# Patient Record
Sex: MALE | Race: WHITE | NOT HISPANIC OR LATINO | Employment: UNEMPLOYED
[De-identification: names, ages, dates, MRNs, and addresses within clinical notes are randomized per-mention and may not be internally consistent; named-entity substitution may affect disease eponyms.]

---

## 2019-10-02 ENCOUNTER — HEALTH MAINTENANCE LETTER (OUTPATIENT)
Age: 59
End: 2019-10-02

## 2021-01-15 ENCOUNTER — HEALTH MAINTENANCE LETTER (OUTPATIENT)
Age: 61
End: 2021-01-15

## 2021-09-05 ENCOUNTER — HEALTH MAINTENANCE LETTER (OUTPATIENT)
Age: 61
End: 2021-09-05

## 2021-11-28 ENCOUNTER — APPOINTMENT (OUTPATIENT)
Dept: GENERAL RADIOLOGY | Facility: CLINIC | Age: 61
End: 2021-11-28
Attending: EMERGENCY MEDICINE
Payer: COMMERCIAL

## 2021-11-28 ENCOUNTER — HOSPITAL ENCOUNTER (EMERGENCY)
Facility: CLINIC | Age: 61
Discharge: HOME | End: 2021-11-28
Attending: EMERGENCY MEDICINE | Admitting: EMERGENCY MEDICINE
Payer: COMMERCIAL

## 2021-11-28 VITALS
BODY MASS INDEX: 26.45 KG/M2 | SYSTOLIC BLOOD PRESSURE: 145 MMHG | HEART RATE: 74 BPM | OXYGEN SATURATION: 95 % | WEIGHT: 195 LBS | TEMPERATURE: 98.3 F | RESPIRATION RATE: 17 BRPM | DIASTOLIC BLOOD PRESSURE: 98 MMHG

## 2021-11-28 DIAGNOSIS — S20.212A RIB CONTUSION, LEFT, INITIAL ENCOUNTER: ICD-10-CM

## 2021-11-28 DIAGNOSIS — S50.02XA CONTUSION OF LEFT ELBOW, INITIAL ENCOUNTER: ICD-10-CM

## 2021-11-28 PROCEDURE — 73080 X-RAY EXAM OF ELBOW: CPT | Mod: LT

## 2021-11-28 PROCEDURE — 250N000011 HC RX IP 250 OP 636: Performed by: EMERGENCY MEDICINE

## 2021-11-28 PROCEDURE — 99285 EMERGENCY DEPT VISIT HI MDM: CPT | Mod: 25

## 2021-11-28 PROCEDURE — 71101 X-RAY EXAM UNILAT RIBS/CHEST: CPT | Mod: LT

## 2021-11-28 PROCEDURE — 250N000013 HC RX MED GY IP 250 OP 250 PS 637: Performed by: EMERGENCY MEDICINE

## 2021-11-28 PROCEDURE — 96372 THER/PROPH/DIAG INJ SC/IM: CPT | Performed by: EMERGENCY MEDICINE

## 2021-11-28 PROCEDURE — 93005 ELECTROCARDIOGRAM TRACING: CPT

## 2021-11-28 RX ORDER — HYDROCODONE BITARTRATE AND ACETAMINOPHEN 5; 325 MG/1; MG/1
2 TABLET ORAL ONCE
Status: COMPLETED | OUTPATIENT
Start: 2021-11-28 | End: 2021-11-28

## 2021-11-28 RX ORDER — HYDROCODONE BITARTRATE AND ACETAMINOPHEN 5; 325 MG/1; MG/1
1 TABLET ORAL EVERY 6 HOURS PRN
Qty: 10 TABLET | Refills: 0 | Status: SHIPPED | OUTPATIENT
Start: 2021-11-28 | End: 2021-12-01

## 2021-11-28 RX ORDER — KETOROLAC TROMETHAMINE 15 MG/ML
15 INJECTION, SOLUTION INTRAMUSCULAR; INTRAVENOUS ONCE
Status: COMPLETED | OUTPATIENT
Start: 2021-11-28 | End: 2021-11-28

## 2021-11-28 RX ADMIN — KETOROLAC TROMETHAMINE 15 MG: 15 INJECTION, SOLUTION INTRAMUSCULAR; INTRAVENOUS at 08:46

## 2021-11-28 RX ADMIN — HYDROCODONE BITARTRATE AND ACETAMINOPHEN 2 TABLET: 5; 325 TABLET ORAL at 08:45

## 2021-11-28 ASSESSMENT — ENCOUNTER SYMPTOMS
COUGH: 0
FEVER: 0
HEADACHES: 0
ARTHRALGIAS: 0

## 2021-11-28 NOTE — ED PROVIDER NOTES
History   Chief Complaint:  Fall and Rib Pain     The history is provided by the patient and medical records.      Alvaro Ascencio is a 61 year old male with history of basal cell cancer and degenerative disc disease who presents with left rib pain after a mechanical fall. The patient states he had socks on and slipped on his wood floors at home yesterday evening at 1900 hitting his ribs on coffee table. He denies any head injury or loss of consciousness. He endorses posterior and anterior left rib pain. He denies any right rib pain. He notes he had a couple beers earlier in the night prior to his fall, however this is normal for him. He denies any other lower extremity injuries including ankle, knee, or hip pain. He denies fevers or cough.     Review of Systems   Constitutional: Negative for fever.   Respiratory: Negative for cough.    Musculoskeletal: Negative for arthralgias.        + left rib pain   Neurological: Negative for syncope and headaches.   All other systems reviewed and are negative.    Allergies:  Gluten Meal  Lactase    Medications:  Klonopin  Propecia  Folvite   Relafen  Naprosyn  Pamelor  Percocet  Desyrel    Past Medical History:     Basal cell cancer  Vitamin D deficiency  Anxiety  Depression   Alcohol dependence in remission    Degenerative disc disease    Past Surgical History:    Ankle surgery, right  Colonoscopy  Knee arthroscopy, bilateral     Social History:  The patient was accompanied to the emergency department by a family member.     Physical Exam     Patient Vitals for the past 24 hrs:   BP Temp Temp src Pulse Resp SpO2 Weight   11/28/21 1000 (!) 159/98 -- -- 75 -- 94 % --   11/28/21 0945 (!) 162/100 -- -- 75 -- 95 % --   11/28/21 0828 -- -- -- -- -- 98 % --   11/28/21 0827 (!) 169/96 -- -- 84 -- -- 88.5 kg (195 lb)   11/28/21 0826 -- 98.3  F (36.8  C) Oral -- 17 -- --     Physical Exam  Physical Exam   Constitutional:  Patient is oriented to person, place, and time. They appear  well-developed and well-nourished. Mild distress secondary to rib pain after a mechanical fall.   HENT:   Mouth/Throat:   Oropharynx is clear and moist.   Eyes:    Conjunctivae normal and EOM are normal. Pupils are equal, round, and reactive to light.   Neck:    Normal range of motion.   Cardiovascular: Normal rate, regular rhythm and normal heart sounds.  Exam reveals no gallop and no friction rub.  No murmur heard.  Pulmonary/Chest:  Effort normal and breath sounds normal. Patient has no wheezes. Patient has no rales.   Abdominal:   Soft. Bowel sounds are normal. Patient exhibits no mass. There is no tenderness. There is no rebound and no guarding.   Musculoskeletal:   Patient has a slight red wil on his left posterior ribs with associated tenderness and mild edema.  He complains of left elbow pain no obvious swelling, bony deformity, ecchymosis normal range of motion.  Neurological:   Patient is alert and oriented to person, place, and time. Patient has normal strength. No cranial nerve deficit or sensory deficit. GCS 15  Skin:   Skin is warm and dry. No rash noted. No erythema.   Psychiatric:   Patient has a normal mood and affect. Patient's behavior is normal. Judgment and thought content normal.     Emergency Department Course   ECG  ECG obtained at 0829, ECG read at 0835  Normal sinus rhythm  Prolonged QT  Abnormal ECG   Rate 80 bpm. CA interval 190 ms. QRS duration 86 ms. QT/QTc 422/486 ms. P-R-T axes 55 -22 21.     Imaging:  XR Ribs unilateral 3 Views + PA Chest, Left  IMPRESSION: The visualized heart and lungs are negative. No definite rib fracture.  Reading per radiology.    XR Elbow Left G/E 3 Views  IMPRESSION: Normal joint spaces and alignment. No fracture or joint effusion.  Reading per radiology.    Emergency Department Course:  Reviewed:  I reviewed nursing notes, vitals, past medical history and Care Everywhere    Assessments:  0831 I obtained history and examined the patient as noted above.    1047 I rechecked the patient and explained findings.    Interventions:  0845 Norco 5-325 mg per tablet, 2 tablets PO  0846 Toradol 15 mg IM    Disposition:  The patient was discharged to home.     Impression & Plan   Medical Decision Making:  Alvaro Ascencio is a 61-year-old gentleman presenting after mechanical fall yesterday injuring his left ribs and left elbow.  He denies hitting his head or losing consciousness.  He had moderate pain on the left ribs breath sounds were audible in all lung fields there is no evidence of crepitus on his exam.  X-ray of the ribs and lungs were performed there is no evidence of rib fracture or pneumothorax.  X-ray was also performed his elbow which shows no evidence of fracture.  At this time it looks like he has contusion of both.  He received the above pain medications and I will write him for a small amount of pain medications at home.  He will follow-up with his primary care doctor and he was sent home with an incentive spirometer.    Diagnosis:    ICD-10-CM    1. Rib contusion, left, initial encounter  S20.212A    2. Contusion of left elbow, initial encounter  S50.02XA      Discharge Medications:  New Prescriptions    HYDROCODONE-ACETAMINOPHEN (NORCO) 5-325 MG TABLET    Take 1 tablet by mouth every 6 hours as needed for pain     Scribe Disclosure:  I, Kusum Lizarraga, am serving as a scribe at 8:31 AM on 11/28/2021 to document services personally performed by Sully Tijerina MD based on my observations and the provider's statements to me.     Sully Tijerina MD  11/28/21 1925

## 2021-11-28 NOTE — ED NOTES
Pt given incentive spirometer and instructed on use. Instructed to use 10x every hour while awake.

## 2021-11-28 NOTE — ED TRIAGE NOTES
"Pt had a few drinks last night, lost his balance while getting up from couch and fell into his coffee table. Pt reports pain to left chest, left rib area and back. Unsure of LOC, denies hitting head. Denies daily alcohol intake. Patient refused to answer all suicide screening questions stating \" I don't know how to answer that.\"   "

## 2021-11-29 LAB
ATRIAL RATE - MUSE: 80 BPM
DIASTOLIC BLOOD PRESSURE - MUSE: NORMAL MMHG
INTERPRETATION ECG - MUSE: NORMAL
P AXIS - MUSE: 55 DEGREES
PR INTERVAL - MUSE: 190 MS
QRS DURATION - MUSE: 86 MS
QT - MUSE: 422 MS
QTC - MUSE: 486 MS
R AXIS - MUSE: -22 DEGREES
SYSTOLIC BLOOD PRESSURE - MUSE: NORMAL MMHG
T AXIS - MUSE: 21 DEGREES
VENTRICULAR RATE- MUSE: 80 BPM

## 2022-02-20 ENCOUNTER — HEALTH MAINTENANCE LETTER (OUTPATIENT)
Age: 62
End: 2022-02-20

## 2022-10-22 ENCOUNTER — HEALTH MAINTENANCE LETTER (OUTPATIENT)
Age: 62
End: 2022-10-22

## 2023-04-01 ENCOUNTER — HEALTH MAINTENANCE LETTER (OUTPATIENT)
Age: 63
End: 2023-04-01

## 2023-05-06 ENCOUNTER — HOSPITAL ENCOUNTER (EMERGENCY)
Facility: CLINIC | Age: 63
Discharge: HOME | End: 2023-05-06
Attending: EMERGENCY MEDICINE | Admitting: EMERGENCY MEDICINE
Payer: COMMERCIAL

## 2023-05-06 ENCOUNTER — APPOINTMENT (OUTPATIENT)
Dept: CT IMAGING | Facility: CLINIC | Age: 63
End: 2023-05-06
Attending: EMERGENCY MEDICINE
Payer: COMMERCIAL

## 2023-05-06 ENCOUNTER — APPOINTMENT (OUTPATIENT)
Dept: GENERAL RADIOLOGY | Facility: CLINIC | Age: 63
End: 2023-05-06
Attending: EMERGENCY MEDICINE
Payer: COMMERCIAL

## 2023-05-06 VITALS
OXYGEN SATURATION: 96 % | WEIGHT: 185 LBS | SYSTOLIC BLOOD PRESSURE: 144 MMHG | RESPIRATION RATE: 20 BRPM | BODY MASS INDEX: 25.06 KG/M2 | DIASTOLIC BLOOD PRESSURE: 86 MMHG | TEMPERATURE: 99 F | HEART RATE: 94 BPM | HEIGHT: 72 IN

## 2023-05-06 DIAGNOSIS — S22.31XA CLOSED FRACTURE OF ONE RIB OF RIGHT SIDE, INITIAL ENCOUNTER: ICD-10-CM

## 2023-05-06 DIAGNOSIS — S27.321A CONTUSION OF RIGHT LUNG, INITIAL ENCOUNTER: ICD-10-CM

## 2023-05-06 LAB
ALBUMIN SERPL BCG-MCNC: 3.5 G/DL (ref 3.5–5.2)
ALBUMIN UR-MCNC: NEGATIVE MG/DL
ALP SERPL-CCNC: 120 U/L (ref 40–129)
ALT SERPL W P-5'-P-CCNC: 32 U/L (ref 10–50)
ANION GAP SERPL CALCULATED.3IONS-SCNC: 11 MMOL/L (ref 7–15)
APPEARANCE UR: CLEAR
AST SERPL W P-5'-P-CCNC: 38 U/L (ref 10–50)
BASOPHILS # BLD AUTO: 0.1 10E3/UL (ref 0–0.2)
BASOPHILS NFR BLD AUTO: 1 %
BILIRUB SERPL-MCNC: 0.6 MG/DL
BILIRUB UR QL STRIP: NEGATIVE
BUN SERPL-MCNC: 9.9 MG/DL (ref 8–23)
CALCIUM SERPL-MCNC: 8.7 MG/DL (ref 8.8–10.2)
CHLORIDE SERPL-SCNC: 104 MMOL/L (ref 98–107)
COLOR UR AUTO: YELLOW
CREAT SERPL-MCNC: 1.03 MG/DL (ref 0.67–1.17)
DEPRECATED HCO3 PLAS-SCNC: 24 MMOL/L (ref 22–29)
EOSINOPHIL # BLD AUTO: 0.1 10E3/UL (ref 0–0.7)
EOSINOPHIL NFR BLD AUTO: 1 %
ERYTHROCYTE [DISTWIDTH] IN BLOOD BY AUTOMATED COUNT: 14.5 % (ref 10–15)
GFR SERPL CREATININE-BSD FRML MDRD: 81 ML/MIN/1.73M2
GLUCOSE SERPL-MCNC: 104 MG/DL (ref 70–99)
GLUCOSE UR STRIP-MCNC: NEGATIVE MG/DL
HCT VFR BLD AUTO: 36.6 % (ref 40–53)
HGB BLD-MCNC: 12.3 G/DL (ref 13.3–17.7)
HGB UR QL STRIP: NEGATIVE
HOLD SPECIMEN: NORMAL
HOLD SPECIMEN: NORMAL
IMM GRANULOCYTES # BLD: 0 10E3/UL
IMM GRANULOCYTES NFR BLD: 0 %
KETONES UR STRIP-MCNC: NEGATIVE MG/DL
LEUKOCYTE ESTERASE UR QL STRIP: NEGATIVE
LIPASE SERPL-CCNC: 18 U/L (ref 13–60)
LYMPHOCYTES # BLD AUTO: 1.2 10E3/UL (ref 0.8–5.3)
LYMPHOCYTES NFR BLD AUTO: 13 %
MCH RBC QN AUTO: 29.1 PG (ref 26.5–33)
MCHC RBC AUTO-ENTMCNC: 33.6 G/DL (ref 31.5–36.5)
MCV RBC AUTO: 87 FL (ref 78–100)
MONOCYTES # BLD AUTO: 1.1 10E3/UL (ref 0–1.3)
MONOCYTES NFR BLD AUTO: 13 %
MUCOUS THREADS #/AREA URNS LPF: PRESENT /LPF
NEUTROPHILS # BLD AUTO: 6.4 10E3/UL (ref 1.6–8.3)
NEUTROPHILS NFR BLD AUTO: 72 %
NITRATE UR QL: NEGATIVE
NRBC # BLD AUTO: 0 10E3/UL
NRBC BLD AUTO-RTO: 0 /100
PH UR STRIP: 7 [PH] (ref 5–7)
PLATELET # BLD AUTO: 235 10E3/UL (ref 150–450)
POTASSIUM SERPL-SCNC: 4.5 MMOL/L (ref 3.4–5.3)
PROT SERPL-MCNC: 6.1 G/DL (ref 6.4–8.3)
RBC # BLD AUTO: 4.23 10E6/UL (ref 4.4–5.9)
RBC URINE: 0 /HPF
SODIUM SERPL-SCNC: 139 MMOL/L (ref 136–145)
SP GR UR STRIP: 1.01 (ref 1–1.03)
TROPONIN T SERPL HS-MCNC: <6 NG/L
UROBILINOGEN UR STRIP-MCNC: NORMAL MG/DL
WBC # BLD AUTO: 8.9 10E3/UL (ref 4–11)
WBC URINE: <1 /HPF

## 2023-05-06 PROCEDURE — 96374 THER/PROPH/DIAG INJ IV PUSH: CPT | Mod: 59

## 2023-05-06 PROCEDURE — 36415 COLL VENOUS BLD VENIPUNCTURE: CPT | Performed by: EMERGENCY MEDICINE

## 2023-05-06 PROCEDURE — 99285 EMERGENCY DEPT VISIT HI MDM: CPT | Mod: 25

## 2023-05-06 PROCEDURE — 96376 TX/PRO/DX INJ SAME DRUG ADON: CPT

## 2023-05-06 PROCEDURE — 250N000011 HC RX IP 250 OP 636: Performed by: EMERGENCY MEDICINE

## 2023-05-06 PROCEDURE — 73502 X-RAY EXAM HIP UNI 2-3 VIEWS: CPT

## 2023-05-06 PROCEDURE — 84484 ASSAY OF TROPONIN QUANT: CPT | Performed by: EMERGENCY MEDICINE

## 2023-05-06 PROCEDURE — 85025 COMPLETE CBC W/AUTO DIFF WBC: CPT | Performed by: EMERGENCY MEDICINE

## 2023-05-06 PROCEDURE — 74177 CT ABD & PELVIS W/CONTRAST: CPT

## 2023-05-06 PROCEDURE — 80053 COMPREHEN METABOLIC PANEL: CPT | Performed by: EMERGENCY MEDICINE

## 2023-05-06 PROCEDURE — 83690 ASSAY OF LIPASE: CPT | Performed by: EMERGENCY MEDICINE

## 2023-05-06 PROCEDURE — 81001 URINALYSIS AUTO W/SCOPE: CPT | Performed by: EMERGENCY MEDICINE

## 2023-05-06 PROCEDURE — 250N000009 HC RX 250: Performed by: EMERGENCY MEDICINE

## 2023-05-06 PROCEDURE — 96375 TX/PRO/DX INJ NEW DRUG ADDON: CPT | Mod: 59

## 2023-05-06 RX ORDER — IOPAMIDOL 755 MG/ML
98 INJECTION, SOLUTION INTRAVASCULAR ONCE
Status: COMPLETED | OUTPATIENT
Start: 2023-05-06 | End: 2023-05-06

## 2023-05-06 RX ORDER — HYDROCODONE BITARTRATE AND ACETAMINOPHEN 5; 325 MG/1; MG/1
1-2 TABLET ORAL EVERY 4 HOURS PRN
Qty: 15 TABLET | Refills: 0 | Status: SHIPPED | OUTPATIENT
Start: 2023-05-06 | End: 2023-06-06

## 2023-05-06 RX ORDER — HYDROMORPHONE HYDROCHLORIDE 1 MG/ML
0.5 INJECTION, SOLUTION INTRAMUSCULAR; INTRAVENOUS; SUBCUTANEOUS
Status: DISCONTINUED | OUTPATIENT
Start: 2023-05-06 | End: 2023-05-06 | Stop reason: HOSPADM

## 2023-05-06 RX ORDER — KETOROLAC TROMETHAMINE 15 MG/ML
10 INJECTION, SOLUTION INTRAMUSCULAR; INTRAVENOUS ONCE
Status: COMPLETED | OUTPATIENT
Start: 2023-05-06 | End: 2023-05-06

## 2023-05-06 RX ADMIN — KETOROLAC TROMETHAMINE 10 MG: 15 INJECTION, SOLUTION INTRAMUSCULAR; INTRAVENOUS at 11:57

## 2023-05-06 RX ADMIN — HYDROMORPHONE HYDROCHLORIDE 0.5 MG: 1 INJECTION, SOLUTION INTRAMUSCULAR; INTRAVENOUS; SUBCUTANEOUS at 13:12

## 2023-05-06 RX ADMIN — SODIUM CHLORIDE 68 ML: 9 INJECTION, SOLUTION INTRAVENOUS at 12:59

## 2023-05-06 RX ADMIN — IOPAMIDOL 98 ML: 755 INJECTION, SOLUTION INTRAVENOUS at 12:59

## 2023-05-06 RX ADMIN — HYDROMORPHONE HYDROCHLORIDE 0.5 MG: 1 INJECTION, SOLUTION INTRAMUSCULAR; INTRAVENOUS; SUBCUTANEOUS at 11:58

## 2023-05-06 ASSESSMENT — ENCOUNTER SYMPTOMS
NAUSEA: 0
ARTHRALGIAS: 1
FEVER: 0
VOMITING: 0
ABDOMINAL PAIN: 1
HEMATURIA: 0

## 2023-05-06 ASSESSMENT — ACTIVITIES OF DAILY LIVING (ADL): ADLS_ACUITY_SCORE: 37

## 2023-05-06 NOTE — ED PROVIDER NOTES
History     Chief Complaint:  Fall, Shoulder Pain, Hip Pain, and Rib Pain    The history is provided by the patient.      Alvaro Ascencio is a left-handed 62 year old male with a history of chronic back pain and alcohol dependence who presents with pain following a fall. The patient reports slipping on a bar of soap in the shower four days ago and falling on his right side, landing on his right shoulder, ribs, and right hip onto the edge of the tub. He has had a lot of pain in these three areas ever since, as well as some diffuse abdominal pain. He also notes pain with deep breathing, which becomes severe when if he sneezes or coughs. He denies hitting his head or any loss of consciousness. Also of note, despite already having two bowel movements today, he states that he continues to feel like he needs to have another bowel movement. Patient denies any nausea, vomiting, fever, or hematuria, or tobacco use. Has a couple drinks nightly. Has not yet taken anything for his pain today.  Of note, he indicates that he also has some pain in the right knee, which she apparently had arthroscopic surgery on a few months ago.  He is able to ambulate without difficulty even with the right knee and right hip pain.    Independent Historian:   None - Patient Only    Review of External Notes: None    ROS:  Review of Systems   Constitutional: Negative for fever.   Gastrointestinal: Positive for abdominal pain. Negative for nausea and vomiting.   Genitourinary: Negative for hematuria.   Musculoskeletal: Positive for arthralgias.   Neurological: Positive for syncope.     Allergies:  Gluten Meal  Tilactase   Pollen Extracts    Medications:    Desyrel  Percocet  Pamelor  Naprosyn  Relafen  Flovite  Propecia  Klonopin  Calcium carbonate    Past Medical History:   BCC  Vitamin D deficiency   Depression  Gynecomastia  CYP2D6 poor metabolizer   Acute bronchitis  Anxiety  DDD  degenerative lumbar  disc  Cervicalgia  Fasciitis  Insomnia  Concussion without loss of consciousness   Chronic back pain   Alcohol dependence     Past Surgical History:    Foot surgery for infection   Bilateral knee arthroscopy  Colonoscopy   Right ankle surgery x4   Tonsillectomy     Family History:    family history is not on file.    Social History:   reports that he has quit smoking. He has never used smokeless tobacco. He reports that he does not drink alcohol and does not use drugs.  PCP: Joni Pink S   Presents with girlfriend.   Has a couple drinks nightly.     Physical Exam     Patient Vitals for the past 24 hrs:   BP Temp Temp src Pulse Resp SpO2 Height Weight   05/06/23 1312 (!) 144/86 -- -- 94 -- -- -- --   05/06/23 1202 -- -- -- -- -- 96 % -- --   05/06/23 1105 (!) 148/77 -- -- -- -- -- -- --   05/06/23 1102 -- 99  F (37.2  C) Temporal 100 20 96 % 1.829 m (6') 83.9 kg (185 lb)        Physical Exam  Nursing note and vitals reviewed.  Constitutional:  Oriented to person, place, and time. Cooperative.   HENT:   Nose:    Nose normal.   Mouth/Throat:   Mucous membranes are normal.   Eyes:    Conjunctivae normal and EOM are normal.      Pupils are equal, round, and reactive to light.   Neck:    Trachea normal.   Cardiovascular:  Normal rate, regular rhythm, normal heart sounds and normal pulses. No murmur heard.  Pulmonary/Chest:  Effort normal and breath sounds normal.   Abdominal:   Soft. Normal appearance and bowel sounds are normal.      There is tenderness to palpation to the right side.      There is no rebound and no CVA tenderness.   Musculoskeletal:  Tenderness to palpation to the right chest wall but no bony step-offs, crepitus, or subcutaneous emphysema.  Some tenderness to palpation over the right hip but full range of motion of the right hip.  Right knee is normal in appearance with full range of motion and no ligamentous laxity.  No cervical spine tenderness to palpation.  Extremities otherwise atraumatic x 4.    Lymphadenopathy:  No cervical adenopathy.   Neurological:   Alert and oriented to person, place, and time. Normal strength.      No cranial nerve deficit or sensory deficit. GCS eye subscore is 4. GCS verbal subscore is 5. GCS motor subscore is 6.   Skin:    Skin is intact. No rash noted.   Psychiatric:   Normal mood and affect.      Emergency Department Course     Imaging:  CT Chest/Abdomen/Pelvis w Contrast   Final Result   IMPRESSION:   1.  Acute minimally displaced right sixth anterior rib fracture with mild overlying soft tissue contusion. No pneumothorax. Otherwise, no acute traumatic findings.   2.  Mild chronic-appearing T10-L1 compression deformities.      XR Pelvis w Hip Right 1 View   Final Result   IMPRESSION: No fracture or dislocation. Very mild degenerative changes in both hips.         Report per radiology    Laboratory:  Labs Ordered and Resulted from Time of ED Arrival to Time of ED Departure   COMPREHENSIVE METABOLIC PANEL - Abnormal       Result Value    Sodium 139      Potassium 4.5      Chloride 104      Carbon Dioxide (CO2) 24      Anion Gap 11      Urea Nitrogen 9.9      Creatinine 1.03      Calcium 8.7 (*)     Glucose 104 (*)     Alkaline Phosphatase 120      AST 38      ALT 32      Protein Total 6.1 (*)     Albumin 3.5      Bilirubin Total 0.6      GFR Estimate 81     ROUTINE UA WITH MICROSCOPIC REFLEX TO CULTURE - Abnormal    Color Urine Yellow      Appearance Urine Clear      Glucose Urine Negative      Bilirubin Urine Negative      Ketones Urine Negative      Specific Gravity Urine 1.010      Blood Urine Negative      pH Urine 7.0      Protein Albumin Urine Negative      Urobilinogen Urine Normal      Nitrite Urine Negative      Leukocyte Esterase Urine Negative      Mucus Urine Present (*)     RBC Urine 0      WBC Urine <1     CBC WITH PLATELETS AND DIFFERENTIAL - Abnormal    WBC Count 8.9      RBC Count 4.23 (*)     Hemoglobin 12.3 (*)     Hematocrit 36.6 (*)     MCV 87      MCH 29.1       MCHC 33.6      RDW 14.5      Platelet Count 235      % Neutrophils 72      % Lymphocytes 13      % Monocytes 13      % Eosinophils 1      % Basophils 1      % Immature Granulocytes 0      NRBCs per 100 WBC 0      Absolute Neutrophils 6.4      Absolute Lymphocytes 1.2      Absolute Monocytes 1.1      Absolute Eosinophils 0.1      Absolute Basophils 0.1      Absolute Immature Granulocytes 0.0      Absolute NRBCs 0.0     LIPASE - Normal    Lipase 18     TROPONIN T, HIGH SENSITIVITY - Normal    Troponin T, High Sensitivity <6       Emergency Department Course & Assessments:    PSS-3    Date and Time Over the past 2 weeks have you felt down, depressed, or hopeless? Over the past 2 weeks have you had thoughts of killing yourself? Have you ever attempted to kill yourself? When did this last happen? User   05/06/23 1108 -- yes -- -- SH   05/06/23 1106 yes no no --                 Item Assessment   Suicidal Ideation None   Plan  none   Intent  none   Suicidal or self-harm behaviors  none   Risk Factors Substance abuse or dependence   Protective Factors Supportive social network of family and/or friends           Interventions:  Medications   HYDROmorphone (PF) (DILAUDID) injection 0.5 mg (0.5 mg Intravenous $Given 5/6/23 1312)   ketorolac (TORADOL) injection 10 mg (10 mg Intravenous $Given 5/6/23 1157)   Saline (68 mLs As instructed $Given 5/6/23 1259)   iopamidol (ISOVUE-370) solution 98 mL (98 mLs Intravenous $Given 5/6/23 1259)        Assessments:  1129 I obtained history and examined the patient as noted above.   1356 I rechecked the patient and explained findings. Prepared for discharge.     Independent Interpretation (X-rays, CTs, rhythm strip):  I reviewed the right hip x-rays and agree with the negative reading.    Consultations/Discussion of Management or Tests:  None        Social Determinants of Health affecting care:   None    Disposition:  The patient was discharged to home.     Impression & Plan       Medical Decision Making:  This is a 62-year-old male came in for further evaluation of mainly right-sided chest and abdominal pain after falling a few days ago.  He does not appear to have hit his head, and even though he does have a slight headache, I do not feel that he requires any imaging of his head or his neck.  I felt it was reasonable however to obtain a CT scan of his chest, abdomen, and pelvis to make sure that he did not have any rib fractures, pneumothorax, hemothorax, pulmonary contusion, or intra-abdominal pathology such as a liver injury.  I also obtained a right hip x-ray to rule out any signs of a hip fracture.  I do not feel that he requires any imaging of his right knee, as I suspect that if he has anything there it would be likely a meniscal injury. His pelvis and right hip x-rays are negative.  The CT scan shows 1 fractured rib with a small pulmonary contusion.  There are no other acute findings, which is reassuring.  I feel that he is safe for discharge and outpatient management.  I will send him home with a prescription for Norco for the pain as well as an incentive spirometer to help prevent pneumonia.  He knows to follow-up with his primary physician as soon as possible and to return here with any concerns or worsening symptoms.    Diagnosis:    ICD-10-CM    1. Closed fracture of one rib of right side, initial encounter  S22.31XA       2. Contusion of right lung, initial encounter  S27.321A            Discharge Medications:  New Prescriptions    HYDROCODONE-ACETAMINOPHEN (NORCO) 5-325 MG TABLET    Take 1-2 tablets by mouth every 4 hours as needed for pain      Scribe Disclosure:  I, Nicanor Rodriguez, am serving as a scribe at 11:17 AM on 5/6/2023 to document services personally performed by Rylan Guallpa MD based on my observations and the provider's statements to me.   5/6/2023   Rylan Guallpa MD Lashkowitz, Seth H, MD  05/06/23 [id number]

## 2023-05-06 NOTE — ED TRIAGE NOTES
Fel in tub on Tuesday. Pt has right shoulder, rib and hip pain. Denies hitting head. Shallow breathing, hard to take deep breath. Extreme pain with sneezing. Pt. Also states abdominal pain started this morning with bloating.      Triage Assessment     Row Name 05/06/23 1104       Triage Assessment (Adult)    Airway WDL WDL       Respiratory WDL    Respiratory WDL X;rhythm/pattern    Rhythm/Pattern, Respiratory shallow       Skin Circulation/Temperature WDL    Skin Circulation/Temperature WDL WDL       Cardiac WDL    Cardiac WDL WDL       Peripheral/Neurovascular WDL    Peripheral Neurovascular WDL WDL       Cognitive/Neuro/Behavioral WDL    Cognitive/Neuro/Behavioral WDL WDL

## 2023-05-10 ENCOUNTER — PATIENT OUTREACH (OUTPATIENT)
Dept: FAMILY MEDICINE | Facility: CLINIC | Age: 63
End: 2023-05-10
Payer: COMMERCIAL

## 2023-05-10 NOTE — TELEPHONE ENCOUNTER
What type of discharge? Emergency Department  Risk of Hospital admission or ED visit: 52%  Is a TCM episode required? Yes  When should the patient follow up with PCP? 7 days of discharge.    Sweta Munguia RN  [facility]

## 2023-05-13 ENCOUNTER — APPOINTMENT (OUTPATIENT)
Dept: ULTRASOUND IMAGING | Facility: CLINIC | Age: 63
End: 2023-05-13
Attending: EMERGENCY MEDICINE
Payer: COMMERCIAL

## 2023-05-13 ENCOUNTER — APPOINTMENT (OUTPATIENT)
Dept: GENERAL RADIOLOGY | Facility: CLINIC | Age: 63
End: 2023-05-13
Attending: EMERGENCY MEDICINE
Payer: COMMERCIAL

## 2023-05-13 ENCOUNTER — HOSPITAL ENCOUNTER (EMERGENCY)
Facility: CLINIC | Age: 63
Discharge: HOME | End: 2023-05-13
Attending: EMERGENCY MEDICINE | Admitting: EMERGENCY MEDICINE
Payer: COMMERCIAL

## 2023-05-13 ENCOUNTER — APPOINTMENT (OUTPATIENT)
Dept: CT IMAGING | Facility: CLINIC | Age: 63
End: 2023-05-13
Attending: EMERGENCY MEDICINE
Payer: COMMERCIAL

## 2023-05-13 VITALS
OXYGEN SATURATION: 99 % | SYSTOLIC BLOOD PRESSURE: 157 MMHG | HEART RATE: 99 BPM | DIASTOLIC BLOOD PRESSURE: 89 MMHG | HEIGHT: 72 IN | RESPIRATION RATE: 16 BRPM | WEIGHT: 185 LBS | TEMPERATURE: 98.4 F | BODY MASS INDEX: 25.06 KG/M2

## 2023-05-13 DIAGNOSIS — R10.11 RUQ ABDOMINAL PAIN: ICD-10-CM

## 2023-05-13 DIAGNOSIS — J06.9 VIRAL URI WITH COUGH: ICD-10-CM

## 2023-05-13 DIAGNOSIS — R05.1 ACUTE COUGH: ICD-10-CM

## 2023-05-13 DIAGNOSIS — S22.41XD CLOSED FRACTURE OF MULTIPLE RIBS OF RIGHT SIDE WITH ROUTINE HEALING, SUBSEQUENT ENCOUNTER: ICD-10-CM

## 2023-05-13 LAB
ALBUMIN SERPL BCG-MCNC: 4.1 G/DL (ref 3.5–5.2)
ALP SERPL-CCNC: 173 U/L (ref 40–129)
ALT SERPL W P-5'-P-CCNC: 42 U/L (ref 10–50)
ANION GAP SERPL CALCULATED.3IONS-SCNC: 13 MMOL/L (ref 7–15)
AST SERPL W P-5'-P-CCNC: 56 U/L (ref 10–50)
ATRIAL RATE - MUSE: 92 BPM
BASOPHILS # BLD AUTO: 0 10E3/UL (ref 0–0.2)
BASOPHILS NFR BLD AUTO: 0 %
BILIRUB SERPL-MCNC: 0.8 MG/DL
BUN SERPL-MCNC: 9.1 MG/DL (ref 8–23)
CALCIUM SERPL-MCNC: 9.2 MG/DL (ref 8.8–10.2)
CHLORIDE SERPL-SCNC: 103 MMOL/L (ref 98–107)
CREAT SERPL-MCNC: 0.96 MG/DL (ref 0.67–1.17)
DEPRECATED HCO3 PLAS-SCNC: 23 MMOL/L (ref 22–29)
DIASTOLIC BLOOD PRESSURE - MUSE: NORMAL MMHG
EOSINOPHIL # BLD AUTO: 0.1 10E3/UL (ref 0–0.7)
EOSINOPHIL NFR BLD AUTO: 1 %
ERYTHROCYTE [DISTWIDTH] IN BLOOD BY AUTOMATED COUNT: 14.4 % (ref 10–15)
FLUAV RNA SPEC QL NAA+PROBE: NEGATIVE
FLUBV RNA RESP QL NAA+PROBE: NEGATIVE
GFR SERPL CREATININE-BSD FRML MDRD: 88 ML/MIN/1.73M2
GLUCOSE SERPL-MCNC: 106 MG/DL (ref 70–99)
HCT VFR BLD AUTO: 41.9 % (ref 40–53)
HGB BLD-MCNC: 14 G/DL (ref 13.3–17.7)
HOLD SPECIMEN: NORMAL
HOLD SPECIMEN: NORMAL
IMM GRANULOCYTES # BLD: 0 10E3/UL
IMM GRANULOCYTES NFR BLD: 0 %
INTERPRETATION ECG - MUSE: NORMAL
LACTATE SERPL-SCNC: 1.7 MMOL/L (ref 0.7–2)
LIPASE SERPL-CCNC: 15 U/L (ref 13–60)
LYMPHOCYTES # BLD AUTO: 1.2 10E3/UL (ref 0.8–5.3)
LYMPHOCYTES NFR BLD AUTO: 14 %
MCH RBC QN AUTO: 29 PG (ref 26.5–33)
MCHC RBC AUTO-ENTMCNC: 33.4 G/DL (ref 31.5–36.5)
MCV RBC AUTO: 87 FL (ref 78–100)
MONOCYTES # BLD AUTO: 0.8 10E3/UL (ref 0–1.3)
MONOCYTES NFR BLD AUTO: 10 %
NEUTROPHILS # BLD AUTO: 6.2 10E3/UL (ref 1.6–8.3)
NEUTROPHILS NFR BLD AUTO: 75 %
NRBC # BLD AUTO: 0 10E3/UL
NRBC BLD AUTO-RTO: 0 /100
NT-PROBNP SERPL-MCNC: 185 PG/ML (ref 0–900)
P AXIS - MUSE: 66 DEGREES
PLATELET # BLD AUTO: 296 10E3/UL (ref 150–450)
POTASSIUM SERPL-SCNC: 4.4 MMOL/L (ref 3.4–5.3)
PR INTERVAL - MUSE: 192 MS
PROT SERPL-MCNC: 7.4 G/DL (ref 6.4–8.3)
QRS DURATION - MUSE: 82 MS
QT - MUSE: 402 MS
QTC - MUSE: 497 MS
R AXIS - MUSE: -10 DEGREES
RBC # BLD AUTO: 4.82 10E6/UL (ref 4.4–5.9)
RSV RNA SPEC NAA+PROBE: NEGATIVE
SARS-COV-2 RNA RESP QL NAA+PROBE: NEGATIVE
SODIUM SERPL-SCNC: 139 MMOL/L (ref 136–145)
SYSTOLIC BLOOD PRESSURE - MUSE: NORMAL MMHG
T AXIS - MUSE: 46 DEGREES
TROPONIN T SERPL HS-MCNC: 7 NG/L
VENTRICULAR RATE- MUSE: 92 BPM
WBC # BLD AUTO: 8.3 10E3/UL (ref 4–11)

## 2023-05-13 PROCEDURE — 250N000009 HC RX 250: Performed by: EMERGENCY MEDICINE

## 2023-05-13 PROCEDURE — 96361 HYDRATE IV INFUSION ADD-ON: CPT

## 2023-05-13 PROCEDURE — 83690 ASSAY OF LIPASE: CPT | Performed by: EMERGENCY MEDICINE

## 2023-05-13 PROCEDURE — 250N000011 HC RX IP 250 OP 636: Performed by: EMERGENCY MEDICINE

## 2023-05-13 PROCEDURE — 71046 X-RAY EXAM CHEST 2 VIEWS: CPT

## 2023-05-13 PROCEDURE — 99285 EMERGENCY DEPT VISIT HI MDM: CPT | Mod: 25

## 2023-05-13 PROCEDURE — C9803 HOPD COVID-19 SPEC COLLECT: HCPCS

## 2023-05-13 PROCEDURE — 83880 ASSAY OF NATRIURETIC PEPTIDE: CPT | Performed by: EMERGENCY MEDICINE

## 2023-05-13 PROCEDURE — 36415 COLL VENOUS BLD VENIPUNCTURE: CPT | Performed by: EMERGENCY MEDICINE

## 2023-05-13 PROCEDURE — 71275 CT ANGIOGRAPHY CHEST: CPT

## 2023-05-13 PROCEDURE — 76705 ECHO EXAM OF ABDOMEN: CPT

## 2023-05-13 PROCEDURE — 80053 COMPREHEN METABOLIC PANEL: CPT | Performed by: EMERGENCY MEDICINE

## 2023-05-13 PROCEDURE — 258N000003 HC RX IP 258 OP 636: Performed by: EMERGENCY MEDICINE

## 2023-05-13 PROCEDURE — 93005 ELECTROCARDIOGRAM TRACING: CPT

## 2023-05-13 PROCEDURE — 96374 THER/PROPH/DIAG INJ IV PUSH: CPT | Mod: 59

## 2023-05-13 PROCEDURE — 87637 SARSCOV2&INF A&B&RSV AMP PRB: CPT | Performed by: EMERGENCY MEDICINE

## 2023-05-13 PROCEDURE — 96375 TX/PRO/DX INJ NEW DRUG ADDON: CPT

## 2023-05-13 PROCEDURE — 84484 ASSAY OF TROPONIN QUANT: CPT | Performed by: EMERGENCY MEDICINE

## 2023-05-13 PROCEDURE — 83605 ASSAY OF LACTIC ACID: CPT | Performed by: EMERGENCY MEDICINE

## 2023-05-13 PROCEDURE — 96376 TX/PRO/DX INJ SAME DRUG ADON: CPT | Mod: 59

## 2023-05-13 PROCEDURE — 85025 COMPLETE CBC W/AUTO DIFF WBC: CPT | Performed by: EMERGENCY MEDICINE

## 2023-05-13 RX ORDER — OXYCODONE HYDROCHLORIDE 5 MG/1
5 TABLET ORAL EVERY 6 HOURS PRN
Qty: 12 TABLET | Refills: 0 | Status: SHIPPED | OUTPATIENT
Start: 2023-05-13 | End: 2023-05-13

## 2023-05-13 RX ORDER — ONDANSETRON 2 MG/ML
4 INJECTION INTRAMUSCULAR; INTRAVENOUS ONCE
Status: COMPLETED | OUTPATIENT
Start: 2023-05-13 | End: 2023-05-13

## 2023-05-13 RX ORDER — IOPAMIDOL 755 MG/ML
69 INJECTION, SOLUTION INTRAVASCULAR ONCE
Status: COMPLETED | OUTPATIENT
Start: 2023-05-13 | End: 2023-05-13

## 2023-05-13 RX ORDER — HYDROMORPHONE HYDROCHLORIDE 1 MG/ML
0.5 INJECTION, SOLUTION INTRAMUSCULAR; INTRAVENOUS; SUBCUTANEOUS ONCE
Status: COMPLETED | OUTPATIENT
Start: 2023-05-13 | End: 2023-05-13

## 2023-05-13 RX ORDER — OXYCODONE HYDROCHLORIDE 5 MG/1
5 TABLET ORAL EVERY 6 HOURS PRN
Qty: 12 TABLET | Refills: 0 | Status: SHIPPED | OUTPATIENT
Start: 2023-05-13 | End: 2023-06-21

## 2023-05-13 RX ADMIN — ONDANSETRON 4 MG: 2 INJECTION INTRAMUSCULAR; INTRAVENOUS at 11:42

## 2023-05-13 RX ADMIN — HYDROMORPHONE HYDROCHLORIDE 0.5 MG: 1 INJECTION, SOLUTION INTRAMUSCULAR; INTRAVENOUS; SUBCUTANEOUS at 12:54

## 2023-05-13 RX ADMIN — SODIUM CHLORIDE 1000 ML: 9 INJECTION, SOLUTION INTRAVENOUS at 11:05

## 2023-05-13 RX ADMIN — SODIUM CHLORIDE 83 ML: 9 INJECTION, SOLUTION INTRAVENOUS at 12:25

## 2023-05-13 RX ADMIN — IOPAMIDOL 69 ML: 755 INJECTION, SOLUTION INTRAVENOUS at 12:25

## 2023-05-13 RX ADMIN — HYDROMORPHONE HYDROCHLORIDE 0.5 MG: 1 INJECTION, SOLUTION INTRAMUSCULAR; INTRAVENOUS; SUBCUTANEOUS at 11:42

## 2023-05-13 ASSESSMENT — ACTIVITIES OF DAILY LIVING (ADL)
ADLS_ACUITY_SCORE: 37
ADLS_ACUITY_SCORE: 37
ADLS_ACUITY_SCORE: 35

## 2023-05-13 ASSESSMENT — ENCOUNTER SYMPTOMS
SHORTNESS OF BREATH: 1
CHILLS: 1
COUGH: 1
FLANK PAIN: 1
MYALGIAS: 0
ABDOMINAL PAIN: 1
BACK PAIN: 1
APPETITE CHANGE: 1
DIZZINESS: 1
NAUSEA: 1

## 2023-05-13 NOTE — DISCHARGE INSTRUCTIONS
Discharge Instructions  Chest Injury    You have been seen today because of a chest injury.  You may have contusion (bruise) of the chest or a rib fracture (broken bone).  Rib fractures can be hard to see on x-ray, so we cannot always be sure whether your rib is broken or bruised. Fortunately, the treatment of these injuries is usually the same, and includes pain control and preventing complications.    Generally, every Emergency Department visit should have a follow-up clinic visit with either a primary or a specialty clinic/provider. Please follow-up as instructed by your emergency provider today.    Return to the Emergency Department if:  You become short of breath.  You develop a fever over 101.5 F.  You pass out or become very weak or pale.  You have abdominal (belly) pain that is new or increasing.  You cough up blood.  You have new symptoms or anything that worries you.    Follow-up with your provider:  As directed by your provider today.  If you are not improved in two weeks.  If you need more pain medicine, since we do not refill pain pills through the Emergency Department.    Home care instructions:  Chest injuries can be painful.  You may take an over-the-counter pain medication such as Tylenol  (acetaminophen), Advil  (ibuprofen), Motrin  (ibuprofen) or Aleve  (naproxen).  Applying ice packs to the painful area can help your pain.   Holding a pillow against your chest can help with pain when you need to move or cough.  You may need to rest and avoid lifting particularly in the first few days after your injury.  Prevention of pneumonia (lung infection) is also a part of managing chest injuries.  Because it can hurt to take deep breaths, you could develop collapsed areas of lung that can develop infection.  To prevent this, you need to take ten very deep breaths every hour while you are awake. Sometimes you will be given a device called an incentive spirometer to help with this. You also need to make  yourself cough every hour.  Rib belts or binders are not generally recommended, since they may increase the risk of pneumonia. If you do use one, use it for only short periods of time.   If you were given a prescription for medicine here today, be sure to read all of the information (including the package insert) that comes with your prescription.  This will include important information about the medicine, its side effects, and any warnings that you need to know about.  The pharmacist who fills the prescription can provide more information and answer questions you may have about the medicine.  If you have questions or concerns that the pharmacist cannot address, please call or return to the Emergency Department.   Remember that you can always come back to the Emergency Department if you are not able to see your regular provider in the amount of time listed above, if you get any new symptoms, or if there is anything that worries you.    Opioid Medication Information    You have been given a prescription for an opioid (narcotic) pain medicine and/or have received a pain medicine while here in the Emergency Department. These medicines can make you drowsy or impaired. You must not drive, operate dangerous equipment, or engage in any other dangerous activities while taking these medications. If you drive while taking these medications, you could be arrested for driving under the influence (DUI). Do not drink any alcohol while you are taking these medications.     Opioid pain medications can cause addiction. If you have a history of chemical dependency of any type, you are at a higher risk of becoming addicted to pain medications.  Only take these prescribed medications to treat your pain when all other options have been tried. Take it for as short a time and as few doses as possible. Store your pain pills in a secure place, as they are frequently stolen and provide a dangerous opportunity for children or visitors in  your house to start abusing these powerful medications. We will not replace any lost or stolen medicine.    If you do not finish your medication, it is a good idea to get rid of it but please do not flush it down the toilet. Please dispose of the remaining medication at a local pharmacy or law enforcement facility. The [place] [employer] has additional information on medication disposal: https://www.pca.[facility].[place].us/living-green/managing-unwanted-medications.      Many prescription pain medications contain Tylenol  (acetaminophen), including Vicodin , Tylenol #3 , Norco , Lortab , and Percocet .  You should not take any extra pills of Tylenol  if you are using these prescription medications or you can get very sick.  Do not ever take more than 3000 mg of acetaminophen in any 24 hour period.    All opioids tend to cause constipation. Drink plenty of water and eat foods that have a lot of fiber, such as fruits, vegetables, prune juice, apple juice and high fiber cereal.  Take a laxative if you don t move your bowels at least every other day. Miralax , Milk of Magnesia, Colace , or Senna  can be used to keep you regular.      Discharge Instructions  Abdominal Pain    Abdominal pain (belly pain) can be caused by many things. Your evaluation today does not show the exact cause for your pain. Your provider today has decided that it is unlikely your pain is due to a life threatening problem, or a problem requiring surgery or hospital admission. Sometimes those problems cannot be found right away, so it is very important that you follow up as directed.  Sometimes only the changes which occur over time allow the cause of your pain to be found.    Generally, every Emergency Department visit should have a follow-up clinic visit with either a primary or a specialty clinic/provider. Please follow-up as instructed by your emergency provider today. With abdominal pain, we often recommend very close follow-up, such  as the following day.    ADULTS:  Return to the Emergency Department right away if:    You get an oral temperature above 102oF or as directed by your provider.  You have blood in your stools. This may be bright red or appear as black, tarry stools.    You keep vomiting (throwing up) or cannot drink liquids.  You see blood when you vomit.   You cannot have a bowel movement or you cannot pass gas.  Your stomach gets bloated or bigger.  Your skin or the whites of your eyes look yellow.  You faint.  You have bloody, frequent or painful urination (peeing).  You have new symptoms or anything that worries you.    CHILDREN:  Return to the Emergency Department right away if your child has any of the above-listed symptoms or the following:    Pushes your hand away or screams/cries when his/her belly is touched.  You notice your child is very fussy or weak.  Your child is very tired and is too tired to eat or drink.  Your child is dehydrated.  Signs of dehydration can be:  Significant change in the amount of wet diapers/urine.  Your infant or child starts to have dry mouth and lips, or no saliva (spit) or tears.    PREGNANT WOMEN:  Return to the Emergency Department right away if you have any of the above-listed symptoms or the following:    You have bleeding, leaking fluid or passing tissue from the vagina.  You have worse pain or cramping, or pain in your shoulder or back.  You have vomiting that will not stop.  You have a temperature of 100oF or more.  Your baby is not moving as much as usual.  You faint.  You get a bad headache with or without eye problems and abdominal pain.  You have a seizure.  You have unusual discharge from your vagina and abdominal pain.    Abdominal pain is pretty common during pregnancy.  Your pain may or may not be related to your pregnancy. You should follow-up closely with your OB provider so they can evaluate you and your baby.  Until you follow-up with your regular provider, do the following:  "    Avoid sex and do not put anything in your vagina.  Drink clear fluids.  Only take medications approved by your provider.    MORE INFORMATION:    Appendicitis:  A possible cause of abdominal pain in any person who still has their appendix is acute appendicitis. Appendicitis is often hard to diagnose.  Testing does not always rule out early appendicitis or other causes of abdominal pain. Close follow-up with your provider and re-evaluations may be needed to figure out the reason for your abdominal pain.    Follow-up:  It is very important that you make an appointment with your clinic and go to the appointment.  If you do not follow-up with your primary provider, it may result in missing an important development which could result in permanent injury or disability and/or lasting pain.  If there is any problem keeping your appointment, call your provider or return to the Emergency Department.    Medications:  Take your medications as directed by your provider today.  Before using over-the-counter medications, ask your provider and make sure to take the medications as directed.  If you have any questions about medications, ask your provider.    Diet:  Resume your normal diet as much as possible, but do not eat fried, fatty or spicy foods while you have pain.  Do not drink alcohol or have caffeine.  Do not smoke tobacco.    Probiotics: If you have been given an antibiotic, you may want to also take a probiotic pill or eat yogurt with live cultures. Probiotics have \"good bacteria\" to help your intestines stay healthy. Studies have shown that probiotics help prevent diarrhea (loose stools) and other intestine problems (including C. diff infection) when you take antibiotics. You can buy these without a prescription in the pharmacy section of the store.     If you were given a prescription for medicine here today, be sure to read all of the information (including the package insert) that comes with your prescription.  " This will include important information about the medicine, its side effects, and any warnings that you need to know about.  The pharmacist who fills the prescription can provide more information and answer questions you may have about the medicine.  If you have questions or concerns that the pharmacist cannot address, please call or return to the Emergency Department.       Remember that you can always come back to the Emergency Department if you are not able to see your regular provider in the amount of time listed above, if you get any new symptoms, or if there is anything that worries you.    Discharge Instructions  Upper Respiratory Infection    The upper respiratory tract includes the sinuses, nasal passages, pharynx, and larynx. A URI, or upper respiratory infection, is an infection of any of the parts of the upper airway. Symptoms include runny nose, congestion, sneezing, sore throat, cough, and fever. URIs are almost always caused by a virus. Antibiotics do not help with viral infections, so are generally not prescribed. A URI is very contagious through coughing and nasal secretions; make sure you wash your hands often and clean surfaces after sneezing, coughing or touching them. While you should start to improve in 3 - 5 days, remember that sometimes a cough can linger for several weeks.    Generally, every Emergency Department visit should have a follow-up clinic visit with either a primary or a specialty clinic/provider. Please follow-up as instructed by your emergency provider today.    Return to the Emergency Department if:  Any of your symptoms get much worse.  You seem very sick, like being too weak to get up.  You have chest pain or shortness of breath.   You have a severe headache.  You are vomiting (throwing up) so much you cannot keep fluids or medicines down.  You have confusion or seem unusually drowsy.  You have a seizure.    What can I do to help myself?  Fill any prescriptions the provider  gave you and take them right away  If you have a fever, get plenty of rest and drink lots of fluids, especially water.  Using a humidifier or saline nose spray will also help loosen mucous.   Clothes or blankets will not change your fever. Do what is comfortable for you.  Bathing or sponging in lukewarm water may help you feel better.  Acetaminophen (Tylenol ) or ibuprofen (Advil , Motrin ) will help bring fever down and may help you feel more comfortable. Be sure to read and follow the package directions, and ask your provider if you have questions.  Do not drink alcohol.  Decongestants may help you feel better. You may use decongestant nose sprays Afrin  (oxymetazoline) or Camacho-Synephrine  (phenylephrine hydrochloride) for up to 3 days, or may use a decongestant tablet like Sudafed  (pseudoephedrine).  If you were given a prescription for medicine here today, be sure to read all of the information (including the package insert) that comes with your prescription.  This will include important information about the medicine, its side effects, and any warnings that you need to know about.  The pharmacist who fills the prescription can provide more information and answer questions you may have about the medicine.  If you have questions or concerns that the pharmacist cannot address, please call or return to the Emergency Department.   Remember that you can always come back to the Emergency Department if you are not able to see your regular provider in the amount of time listed above, if you get any new symptoms, or if there is anything that worries you.

## 2023-05-13 NOTE — ED TRIAGE NOTES
"Fell in the shower about 10 days ago, here in ED last Saturday, Ct scan rib fx and \"bruised\" lung. Using Incentive Spirometer at home and pain medications, PMD prescribed anti-inflammatory.  Past 1-2 days, increasing shortness of breath, chills, feverish, dizzy upon standing.         "

## 2023-05-13 NOTE — ED PROVIDER NOTES
History     Chief Complaint:  Shortness of Breath       The history is provided by the patient.      Alvaro Ascencio is a 62 year old male who presents with a female  for shortness of breath. Patient fell in the shower ten days ago and was seen in the ED 3 days later for a closed fracture of one rib on the right side and contusion of the right lung. Since then, he has been experiencing increased shortness of breath, chills, hot spells, and dizziness upon standing. The dizziness occurred yesterday when getting out of bed, and he describes it as feeling as if the room was spinning. Patient endorses RUQ abdominal pain that has been improving, nausea, decreased appetite, and productive cough. He says that he is still experiencing right flank pain that radiates to his back. Patient denies bilateral leg pain or swelling. He denies a history of gallbladder problems.      Independent Historian:   Female  present at bedside endorses the history above.    Review of External Notes: Emergency department visit here dated 5/6/2023 when he was diagnosed with rib fracture.    ROS:  Review of Systems   Constitutional: Positive for appetite change (decreased) and chills.   Respiratory: Positive for cough and shortness of breath.    Cardiovascular: Negative for leg swelling.   Gastrointestinal: Positive for abdominal pain and nausea.   Genitourinary: Positive for flank pain (R).   Musculoskeletal: Positive for back pain (R). Negative for myalgias (leg).   Neurological: Positive for dizziness.   All other systems reviewed and are negative.    Allergies:  Tilactase     Medications:    Clonazepam  Finasteride  Nabumetone  Naproxen   Nortriptyline   Trazodone  Viibryd  Vistaril   Crestor  Mobic    Past Medical History:    Basal cell carcinoma  Vitamin D deficiency   Gynecomastia  Major depressive disorder  Hypercholesterolemia  It band syndrome, right  DDD  KATHI   Primary osteoarthritis of left hip  Degenerative  lumbar disc  Villonodular synovitis of left shoulder region  Cervicalgia  Fasciitis  Insomnia     Past Surgical History:    Right ankle procedures x 4  Bilateral knee arthroscopy   Foot surgery for infection     Social History:  Patient presents alone via personal vehicle       Physical Exam     Patient Vitals for the past 24 hrs:   BP Temp Temp src Pulse Resp SpO2 Height Weight   05/13/23 0956 (!) 157/89 98.4  F (36.9  C) Temporal 99 16 99 % 1.829 m (6') 83.9 kg (185 lb)        Physical Exam  Constitutional: Well appearing.  HEENT: Atraumatic.  PERRL.  EOMI.  Moist mucous membranes.  Neck: Soft.  Supple.  No JVD.  Cardiac: Regular rate and rhythm.  No murmur or rub.  Respiratory: Clear to auscultation bilaterally.  No respiratory distress.  No wheezing, rhonchi, or rales.  Abdomen: Soft and nontender.  No rebound or guarding.  Nondistended.  Musculoskeletal: Mild tenderness to the right anterior lateral chest wall with no visible bruising or swelling noted.  No edema.  Normal range of motion.  Neurologic: Alert and oriented x3.  Normal tone and bulk.  .  5/5 strength in bilateral upper and lower extremities.  Sensation to light touch intact throughout.  Normal gait.  Skin: No rashes.  No edema.  Psych: Normal affect.  Normal behavior.        Emergency Department Course   ECG  ECG results from 05/13/23   EKG 12-lead, tracing only     Value    Systolic Blood Pressure     Diastolic Blood Pressure     Ventricular Rate 92    Atrial Rate 92    WV Interval 192    QRS Duration 82        QTc 497    P Axis 66    R AXIS -10    T Axis 46    Interpretation ECG      ECG taken at 1129 and read at 1133  Sinus rhythm  Low voltage QRS  Prolonged QT  Abnormal ECG  When compared with ECG of 28-NOV-2021 08:29,  No significant change was found       Imaging:  Abdomen US, limited (RUQ only)   Final Result   IMPRESSION:   Mild hepatic steatosis.            CT Chest Pulmonary Embolism w Contrast   Final Result   IMPRESSION:      1.   No acute pulmonary embolism.   2.  Acute/subacute, minimally displaced fractures of the right anterior sixth and seventh ribs. No periosteal reaction/healing response.      XR Chest 2 Views   Final Result   IMPRESSION: Negative chest. Lungs clear. No pneumothorax. No pleural effusions.         Report per radiology    Laboratory:  Labs Ordered and Resulted from Time of ED Arrival to Time of ED Departure   COMPREHENSIVE METABOLIC PANEL - Abnormal       Result Value    Sodium 139      Potassium 4.4      Chloride 103      Carbon Dioxide (CO2) 23      Anion Gap 13      Urea Nitrogen 9.1      Creatinine 0.96      Calcium 9.2      Glucose 106 (*)     Alkaline Phosphatase 173 (*)     AST 56 (*)     ALT 42      Protein Total 7.4      Albumin 4.1      Bilirubin Total 0.8      GFR Estimate 88     LACTIC ACID WHOLE BLOOD - Normal    Lactic Acid 1.7     TROPONIN T, HIGH SENSITIVITY - Normal    Troponin T, High Sensitivity 7     NT PROBNP INPATIENT - Normal    N terminal Pro BNP Inpatient 185     INFLUENZA A/B, RSV, & SARS-COV2 PCR - Normal    Influenza A PCR Negative      Influenza B PCR Negative      RSV PCR Negative      SARS CoV2 PCR Negative     CBC WITH PLATELETS AND DIFFERENTIAL    WBC Count 8.3      RBC Count 4.82      Hemoglobin 14.0      Hematocrit 41.9      MCV 87      MCH 29.0      MCHC 33.4      RDW 14.4      Platelet Count 296      % Neutrophils 75      % Lymphocytes 14      % Monocytes 10      % Eosinophils 1      % Basophils 0      % Immature Granulocytes 0      NRBCs per 100 WBC 0      Absolute Neutrophils 6.2      Absolute Lymphocytes 1.2      Absolute Monocytes 0.8      Absolute Eosinophils 0.1      Absolute Basophils 0.0      Absolute Immature Granulocytes 0.0      Absolute NRBCs 0.0     LIPASE        Emergency Department Course & Assessments:     Interventions:  Medications   0.9% sodium chloride BOLUS (0 mLs Intravenous Stopped 5/13/23 1255)   ondansetron (ZOFRAN) injection 4 mg (4 mg Intravenous $Given  5/13/23 1142)   HYDROmorphone (PF) (DILAUDID) injection 0.5 mg (0.5 mg Intravenous $Given 5/13/23 1142)   Saline (83 mLs As instructed $Given 5/13/23 1225)   iopamidol (ISOVUE-370) solution 69 mL (69 mLs Intravenous $Given 5/13/23 1225)   HYDROmorphone (PF) (DILAUDID) injection 0.5 mg (0.5 mg Intravenous $Given 5/13/23 1254)        Assessments:  1122 I obtained history and examined the patient as noted above.  1515 I rechecked and updated the patient. At this point I feel that the patient is safe for discharge, and the patient agrees.     Independent Interpretation (X-rays, CTs, rhythm strip):  Chest x-ray without opacities such as pneumonia no visible pneumothorax    Consultations/Discussion of Management or Tests:  None        Social Determinants of Health affecting care:   None    Disposition:  The patient was discharged to home.     Impression & Plan      Medical Decision Making:  Alvaro Ascencio is a 62-year-old man who is afebrile and hemodynamically stable.  He is neurologically intact.  He has some mild right upper quadrant abdominal exam.  Differential diagnosis includes PE versus pneumonia versus ongoing pain from rib fracture versus biliary colic versus cholecystitis versus possible obstruction versus other.  Lab work-up as noted as above and is grossly unrevealing.  CT PE study was undertaken with no evidence of PE and no evidence of pneumonia.  There is now visible second rib fracture that was not seen initially.  Is unclear if this is because it was a small occult fracture at that time or he developed a new fracture.  He has no new falls since he was last here, however, does note some coughing.  He is feeling improved with the above interventions.  Medical ultrasound with no acute abnormalities.  Liver enzymes within normal limits.  He has no tenderness over the right upper quadrant and no Pérez sign.  Is tolerating p.o. intake.  Discussed plan for home with second prescription for pain control  and stressed the need for close primary care follow-up.  At this point, see indication for admission and he feels comfortable discharging home.  Strict return precautions were given and he was in no distress at time of discharge.    Diagnosis:    ICD-10-CM    1. Closed fracture of multiple ribs of right side with routine healing, subsequent encounter  S22.41XD       2. Acute cough  R05.1       3. RUQ abdominal pain  R10.11       4. Viral URI with cough  J06.9            Discharge Medications:  New Prescriptions    OXYCODONE (ROXICODONE) 5 MG TABLET    Take 1 tablet (5 mg) by mouth every 6 hours as needed for pain        Scribe Disclosure:  Palma CHRISTINA, am serving as a scribe at 11:25 AM on 5/13/2023 to document services personally performed by Demetri Holbrook MD based on my observations and the provider's statements to me.     5/13/2023   Demetri Holbrook MD Salay, Nicholas J, MD  05/15/23 1224

## 2023-05-15 NOTE — TELEPHONE ENCOUNTER
Error. Pt has never been seen at EP clinic. PCP is at [facility].    Alaina LINDSEY RN  [facility] Triage Team

## 2023-06-06 ENCOUNTER — APPOINTMENT (OUTPATIENT)
Dept: GENERAL RADIOLOGY | Facility: CLINIC | Age: 63
End: 2023-06-06
Attending: STUDENT IN AN ORGANIZED HEALTH CARE EDUCATION/TRAINING PROGRAM
Payer: COMMERCIAL

## 2023-06-06 ENCOUNTER — HOSPITAL ENCOUNTER (EMERGENCY)
Facility: CLINIC | Age: 63
Discharge: HOME | End: 2023-06-06
Attending: EMERGENCY MEDICINE | Admitting: EMERGENCY MEDICINE
Payer: COMMERCIAL

## 2023-06-06 VITALS
HEART RATE: 86 BPM | TEMPERATURE: 98.2 F | RESPIRATION RATE: 20 BRPM | BODY MASS INDEX: 25.09 KG/M2 | WEIGHT: 185 LBS | DIASTOLIC BLOOD PRESSURE: 77 MMHG | OXYGEN SATURATION: 98 % | SYSTOLIC BLOOD PRESSURE: 140 MMHG

## 2023-06-06 DIAGNOSIS — R07.81 RIB PAIN ON RIGHT SIDE: ICD-10-CM

## 2023-06-06 DIAGNOSIS — M25.511 RIGHT SHOULDER PAIN: ICD-10-CM

## 2023-06-06 DIAGNOSIS — S22.41XA CLOSED FRACTURE OF MULTIPLE RIBS OF RIGHT SIDE, INITIAL ENCOUNTER: ICD-10-CM

## 2023-06-06 PROCEDURE — 71101 X-RAY EXAM UNILAT RIBS/CHEST: CPT | Mod: RT

## 2023-06-06 PROCEDURE — 99284 EMERGENCY DEPT VISIT MOD MDM: CPT

## 2023-06-06 PROCEDURE — 250N000013 HC RX MED GY IP 250 OP 250 PS 637: Performed by: STUDENT IN AN ORGANIZED HEALTH CARE EDUCATION/TRAINING PROGRAM

## 2023-06-06 PROCEDURE — 73030 X-RAY EXAM OF SHOULDER: CPT | Mod: RT

## 2023-06-06 RX ORDER — HYDROCODONE BITARTRATE AND ACETAMINOPHEN 5; 325 MG/1; MG/1
2 TABLET ORAL ONCE
Status: COMPLETED | OUTPATIENT
Start: 2023-06-06 | End: 2023-06-06

## 2023-06-06 RX ORDER — HYDROCODONE BITARTRATE AND ACETAMINOPHEN 5; 325 MG/1; MG/1
1 TABLET ORAL EVERY 6 HOURS PRN
Qty: 15 TABLET | Refills: 0 | Status: SHIPPED | OUTPATIENT
Start: 2023-06-06

## 2023-06-06 RX ADMIN — HYDROCODONE BITARTRATE AND ACETAMINOPHEN 2 TABLET: 5; 325 TABLET ORAL at 09:46

## 2023-06-06 ASSESSMENT — ACTIVITIES OF DAILY LIVING (ADL): ADLS_ACUITY_SCORE: 37

## 2023-06-06 NOTE — ED TRIAGE NOTES
Pt presents after sustaining a fall 5 weeks ago and was seen here and diagnosed with 2 broken ribs on the R side and states pain has been worsening. He reports now he is having worse pain in the R shoulder as well with decreased ROM and trouble sleeping. States he has been out of his pain meds for 2 weeks. Was given orders for PT by his PCP but has not scheduled that     Triage Assessment     Row Name 06/06/23 0828       Triage Assessment (Adult)    Airway WDL WDL       Respiratory WDL    Respiratory WDL WDL       Skin Circulation/Temperature WDL    Skin Circulation/Temperature WDL WDL       Cardiac WDL    Cardiac WDL WDL       Peripheral/Neurovascular WDL    Peripheral Neurovascular WDL WDL       Cognitive/Neuro/Behavioral WDL    Cognitive/Neuro/Behavioral WDL WDL

## 2023-06-06 NOTE — ED PROVIDER NOTES
Emergency Department Attending Supervision Note  6/6/2023  9:31 AM      I evaluated this patient in conjunction with Patsy Cantrell PA-C      Briefly, the patient presented with worsening pain after breaking two ribs on the right side five weeks ago after a fall in the bathtub. He is now having pain in his right shoulder that exacerbates with coughing, sneezing, and deep breathing, and limited range of motion.  He states that when he originally fell he fell on his right side with his arm above his head.  He is left-hand dominant and has had no known rotator cuff or other shoulder injuries.  He did have a productive cough a couple of weeks ago, but this has improved. No current fevers or cough. He has been out of pain medication for the past two weeks.  He does have an incentive spirometer that he carries around with him to use.  He denies any additional falls.  He was seen here the first week of May and had a CT of his chest which showed a fracture of the right sixth rib he then came back a week later with persistent pain and was found on CT to have a second rib fracture on the right seventh rib.    On my exam,     Physical Exam   Constitutional:  Patient is oriented to person, place, and time. They appear well-developed and well-nourished. Mild distress secondary to right rib and shoulder pain   HENT:   Eyes:    Conjunctivae normal and EOM are normal. Pupils are equal, round, and reactive to light.   Neck:    Normal range of motion.   Cardiovascular: Normal rate, regular rhythm and normal heart sounds.  Exam reveals no gallop and no friction rub.  No murmur heard.  Pulmonary/Chest:  Effort normal and breath sounds normal. Patient has no wheezes. Patient has no rales. Right rib pain with deep breathing  Musculoskeletal:  Mild effusion of the right shoulder no erythema or warmth.  Limited range of motion due to pain..   Neurological:   Patient is alert and oriented to person, place, and time. Patient has normal  strength. No cranial nerve deficit or sensory deficit. GCS 15  Skin:   Skin is warm and dry. No rash noted. No erythema.   Psychiatric:   Patient has a normal mood and affect. Patient's behavior is normal. Judgment and thought content normal.         Results:    ED course:    My impression is Alvaro is a 62-year-old gentleman presenting to the emergency department with persistent right rib pain.  He denies any further falls.  He is not hypoxic.  His respiratory exam does not show any crackles or wheezes.  His shoulder does have some edema but without any concerning signs of a septic joint infection clavicle pain or neurologic deficits.  X-ray of his shoulder and his ribs were performed he now has a third right rib fracture but no underlying pneumothorax or pneumonia.  He also has some degenerative disease of his right shoulder which is likely causing his pain.  Obviously cannot evaluate for rotator cuff injury but suspect this also could be involved as well given the nature of his fall.  Due to the fact that he now has 3 rib fractures we did offer pain medication to go home with.  He will also follow-up with his primary care doctor.  He declined observation admission for pain control.  Referred to [facility] orthopedic for his shoulder.      Diagnosis    ICD-10-CM    1. Rib pain on right side  R07.81       2. Right shoulder pain  M25.511             Sully Tijerina MD Bochert, Michelle Ann, MD  06/06/23 1129

## 2023-06-06 NOTE — ED PROVIDER NOTES
History     Chief Complaint:  Rib Pain       HPI   Alvaro Ascencio is a 62 year old male with a history of alcohol use disorder, in remission, chronic back pain, and known rib fractures who presents with worsening right-sided lateral chest pain along with right shoulder pain.  Patient fell in the shower on 5/2/2023, resulting in him fracturing his right sixth anterior rib along with sustaining a small pulmonary contusion.  On 5/13/2023, he presented to the emergency room once more due to shortness of breath and had repeat CT done that found additional rib fracture to the seventh rib.  She has been taking prescription pain medication at home but has been without for 2 weeks.  States that he is continuing to have worsening right-sided chest pain, exacerbated by coughing and sneezing.  Also endorses worsening right shoulder pain with limited range of motion along with sensation of arm walking up and certain positions with excruciating pain.  Patient has been taking Tylenol and ibuprofen with minimal relief.  He has received a PT referral but has not started this at this time.  Denies fevers, chills, cough or sputum production, worsening cough, nausea, vomiting, URI symptoms, chest pain outside of his rib pain.       Independent Historian:   None - Patient Only    Review of External Notes:   Reviewed office visit note from 5/8/2023 -prescribed 12 tablets of [place]  Reviewed psychiatry note from 5/17/2023 -patient admitted to drinking prior to his fall on 5/2/23  Reviewed office visit note from 5/19/2023 -prescribed 15 tablets of Norco        Medications:    HYDROcodone-acetaminophen (NORCO) 5-325 MG tablet  Calcium Carbonate-Vitamin D (CALCIUM 500 + D PO)  Cholecalciferol (VITAMIN D) 2000 UNITS tablet  clonazePAM (KLONOPIN) 0.5 MG tablet  finasteride (PROPECIA) 1 MG tablet  folic acid (FOLVITE) 1 MG tablet  MELATONIN PO  nabumetone (RELAFEN) 500 MG tablet  naproxen (NAPROSYN) 500 MG tablet  nortriptyline (PAMELOR)  10 MG/5ML solution  oxyCODONE (ROXICODONE) 5 MG tablet  oxyCODONE-acetaminophen (PERCOCET) 5-325 MG per tablet  Thiamine HCl (VITAMIN B-1 PO)  TRAZodone (DESYREL) 150 MG tablet        Past Medical History:    Past Medical History:   Diagnosis Date     Basal cell cancer      Vitamin D deficiency 7/25/2012       Past Surgical History:    Past Surgical History:   Procedure Laterality Date     ANKLE SURGERY      right X4     COLONOSCOPY  11/29/2013    Procedure: COLONOSCOPY;  colonoscopy;  Surgeon: Holden Smith MD;  Location:  GI     KNEE SURGERY      bilateral arthroscopy     ORTHOPEDIC SURGERY      foot surgery for incection        Physical Exam     Patient Vitals for the past 24 hrs:   BP Temp Temp src Pulse Resp SpO2 Weight   06/06/23 0945 -- -- -- -- -- 98 % --   06/06/23 0930 -- -- -- -- -- 99 % --   06/06/23 0927 -- -- -- -- -- 99 % --   06/06/23 0926 (!) 141/86 -- -- 89 -- -- --   06/06/23 0828 (!) 145/89 98.2  F (36.8  C) Oral 98 20 99 % 83.9 kg (185 lb)        Physical Exam  General: Alert and cooperative with exam. Normal mentation. Diaphoretic.  Head:  Scalp is NC/AT  Eyes:  No scleral icterus, PERRL  ENT:  The external nose and ears are normal.   Neck:  Normal range of motion without rigidity.  CV:  Regular rate and rhythm    No pathologic murmur   Resp:  Breath sounds are clear bilaterally    Non-labored, no retractions or accessory muscle use  GI:  Abdomen is soft, no distension, no tenderness. No peritoneal signs  MS:  Mild pain to palpation along posterior and anterior right shoulder. Limited AROM with right shoulder abduction and extension/flexion.  Skin:  Warm and dry, No rash or lesions noted.  Neuro:  Oriented x 3. No gross motor deficits.      Emergency Department Course     Imaging:  Ribs XR, unilat 3 views + PA chest, right   Final Result   IMPRESSION:   1.  Nondisplaced fractures of the right anterior sixth, seventh, and   eighth ribs.   2.  Unremarkable heart and lungs.      TERE VINES  MD SYBIL            SYSTEM ID:  [id number]      XR Shoulder Right 2 Views   Final Result   IMPRESSION:   1.  No fracture or joint malalignment.   2.  Mild/moderate right glenohumeral degenerative arthrosis.   3.  Mild acromioclavicular arthrosis.      TERE DEVINE MD            SYSTEM ID:  [id number]         Report per radiology    Laboratory:  Labs Ordered and Resulted from Time of ED Arrival to Time of ED Departure - No data to display     Procedures   None    Emergency Department Course & Assessments:    Interventions:  Medications   HYDROcodone-acetaminophen (NORCO) 5-325 MG per tablet 2 tablet (2 tablets Oral $Given 6/6/23 0948)        Independent Interpretation (X-rays, CTs, rhythm strip):  Rib fracture present to 6-8 on right side  No shoulder fractures or dislocations    Consultations/Discussion of Management or Tests:  ED Course as of 06/06/23 1128   Tue Jun 06, 2023 0933 Performed initial assessment   1124 Reassessed patient and discussed results       Social Determinants of Health affecting care:   None    Disposition:  The patient was discharged to home.     Impression & Plan      Medical Decision Making:  Alvaro Ascencio is a 62 year old male who presents with right lateral chest wall pain along with right shoulder pain.  Patient has known sixth and seventh rib fractures on the right side.  Has been taking Norco at home with significant relief but has been without for 2 weeks.  Denies any recurrent falls, however imaging today does show an additional rib fractures of the right eighth rib.  Patient is adamant that he has not fallen or bumped into anything that could have caused this.  Uncertain of if this is acute or from his original injury earlier last month.  Imaging of the shoulder without any fractures or dislocations.  Likely that patient has rotator cuff injury due to his fall and history of sport activities.  Provided patient with prescription for short course of Norco along with  contact information for following up with [facility].  Also instructed patient to follow-up with his primary care provider in a week or 2 for reassessment of his pain.  Instructed him to continue with his incentive spirometry at home.  Discussed reasons to return to ER including worsening pain that cannot be controlled at home, repeat injury, or other worrisome concerns.      Diagnosis:    ICD-10-CM    1. Rib pain on right side  R07.81       2. Right shoulder pain  M25.511       3. Closed fracture of multiple ribs of right side, initial encounter  S22.41XA            Discharge Medications:  New Prescriptions    HYDROCODONE-ACETAMINOPHEN (NORCO) 5-325 MG TABLET    Take 1 tablet by mouth every 6 hours as needed for severe pain          6/6/2023   ANY Hess Lauren R, PA-C  06/06/23 1128

## 2023-06-06 NOTE — DISCHARGE INSTRUCTIONS
Please make an appointment to follow up with [facility] - [phone] - [address] [place] [address] as soon as possible for discussion of ongoing shoulder pain and rib pain after fractures.    Please follow up with your PCP for ongoing rib fracture pain. If symptoms worsen, please return to ER.

## 2023-06-09 ENCOUNTER — MEDICAL CORRESPONDENCE (OUTPATIENT)
Dept: HEALTH INFORMATION MANAGEMENT | Facility: CLINIC | Age: 63
End: 2023-06-09

## 2023-06-09 ENCOUNTER — HOSPITAL ENCOUNTER (EMERGENCY)
Facility: CLINIC | Age: 63
Discharge: HOME | End: 2023-06-09
Attending: EMERGENCY MEDICINE | Admitting: EMERGENCY MEDICINE
Payer: COMMERCIAL

## 2023-06-09 VITALS
RESPIRATION RATE: 18 BRPM | HEIGHT: 72 IN | WEIGHT: 194.7 LBS | SYSTOLIC BLOOD PRESSURE: 152 MMHG | OXYGEN SATURATION: 99 % | HEART RATE: 80 BPM | TEMPERATURE: 97.8 F | DIASTOLIC BLOOD PRESSURE: 90 MMHG | BODY MASS INDEX: 26.37 KG/M2

## 2023-06-09 DIAGNOSIS — R07.81 RIB PAIN: ICD-10-CM

## 2023-06-09 DIAGNOSIS — F33.41 MAJOR DEPRESSIVE DISORDER, RECURRENT EPISODE, IN PARTIAL REMISSION (H): ICD-10-CM

## 2023-06-09 DIAGNOSIS — R45.851 SUICIDAL IDEATION: ICD-10-CM

## 2023-06-09 DIAGNOSIS — F41.9 ANXIETY: ICD-10-CM

## 2023-06-09 LAB — SARS-COV-2 RNA RESP QL NAA+PROBE: NEGATIVE

## 2023-06-09 PROCEDURE — C9803 HOPD COVID-19 SPEC COLLECT: HCPCS

## 2023-06-09 PROCEDURE — 250N000013 HC RX MED GY IP 250 OP 250 PS 637: Performed by: EMERGENCY MEDICINE

## 2023-06-09 PROCEDURE — 90791 PSYCH DIAGNOSTIC EVALUATION: CPT

## 2023-06-09 PROCEDURE — 99204 OFFICE O/P NEW MOD 45 MIN: CPT | Performed by: PSYCHIATRY & NEUROLOGY

## 2023-06-09 PROCEDURE — 99285 EMERGENCY DEPT VISIT HI MDM: CPT | Mod: 25

## 2023-06-09 PROCEDURE — 250N000013 HC RX MED GY IP 250 OP 250 PS 637: Performed by: PSYCHIATRY & NEUROLOGY

## 2023-06-09 PROCEDURE — 87635 SARS-COV-2 COVID-19 AMP PRB: CPT | Performed by: EMERGENCY MEDICINE

## 2023-06-09 RX ORDER — CLONAZEPAM 0.5 MG/1
0.5 TABLET ORAL
Status: DISCONTINUED | OUTPATIENT
Start: 2023-06-09 | End: 2023-06-09 | Stop reason: HOSPADM

## 2023-06-09 RX ORDER — VITAMIN B COMPLEX
50 TABLET ORAL DAILY
Status: DISCONTINUED | OUTPATIENT
Start: 2023-06-09 | End: 2023-06-09 | Stop reason: HOSPADM

## 2023-06-09 RX ORDER — VILAZODONE HYDROCHLORIDE 40 MG/1
40 TABLET ORAL DAILY
COMMUNITY

## 2023-06-09 RX ORDER — VILAZODONE HYDROCHLORIDE 20 MG/1
40 TABLET ORAL DAILY
Status: DISCONTINUED | OUTPATIENT
Start: 2023-06-09 | End: 2023-06-09 | Stop reason: HOSPADM

## 2023-06-09 RX ORDER — HYDROCODONE BITARTRATE AND ACETAMINOPHEN 5; 325 MG/1; MG/1
1 TABLET ORAL EVERY 6 HOURS PRN
Status: DISCONTINUED | OUTPATIENT
Start: 2023-06-09 | End: 2023-06-09 | Stop reason: HOSPADM

## 2023-06-09 RX ADMIN — HYDROCODONE BITARTRATE AND ACETAMINOPHEN 1 TABLET: 5; 325 TABLET ORAL at 08:02

## 2023-06-09 RX ADMIN — VILAZODONE HYDROCHLORIDE 40 MG: 20 TABLET ORAL at 11:41

## 2023-06-09 RX ADMIN — HYDROCODONE BITARTRATE AND ACETAMINOPHEN 1 TABLET: 5; 325 TABLET ORAL at 14:12

## 2023-06-09 RX ADMIN — Medication 50 MCG: at 11:41

## 2023-06-09 ASSESSMENT — COLUMBIA-SUICIDE SEVERITY RATING SCALE - C-SSRS
1. IN THE PAST MONTH, HAVE YOU WISHED YOU WERE DEAD OR WISHED YOU COULD GO TO SLEEP AND NOT WAKE UP?: YES
TOTAL  NUMBER OF INTERRUPTED ATTEMPTS LIFETIME: NO
3. HAVE YOU BEEN THINKING ABOUT HOW YOU MIGHT KILL YOURSELF?: YES
TOTAL  NUMBER OF ABORTED OR SELF INTERRUPTED ATTEMPTS LIFETIME: NO
4. HAVE YOU HAD THESE THOUGHTS AND HAD SOME INTENTION OF ACTING ON THEM?: NO
5. HAVE YOU STARTED TO WORK OUT OR WORKED OUT THE DETAILS OF HOW TO KILL YOURSELF? DO YOU INTEND TO CARRY OUT THIS PLAN?: NO
ATTEMPT LIFETIME: NO
5. HAVE YOU STARTED TO WORK OUT OR WORKED OUT THE DETAILS OF HOW TO KILL YOURSELF? DO YOU INTEND TO CARRY OUT THIS PLAN?: NO
4. HAVE YOU HAD THESE THOUGHTS AND HAD SOME INTENTION OF ACTING ON THEM?: NO
6. HAVE YOU EVER DONE ANYTHING, STARTED TO DO ANYTHING, OR PREPARED TO DO ANYTHING TO END YOUR LIFE?: NO
2. HAVE YOU ACTUALLY HAD ANY THOUGHTS OF KILLING YOURSELF?: YES
1. HAVE YOU WISHED YOU WERE DEAD OR WISHED YOU COULD GO TO SLEEP AND NOT WAKE UP?: YES
2. HAVE YOU ACTUALLY HAD ANY THOUGHTS OF KILLING YOURSELF?: YES

## 2023-06-09 ASSESSMENT — ACTIVITIES OF DAILY LIVING (ADL)
ADLS_ACUITY_SCORE: 37
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
## 2023-06-09 NOTE — ED NOTES
Patient agreeable to discharge plan. Discharge instructions reviewed with patient including follow-up care plan. Reviewed safety plan and outpatient resources. Denies SI and HI. All belongings that were brought into the hospital have been returned to patient. Escorted off the unit at 1600 accompanied by [facility] staff. Discharged to home via ride from friend.

## 2023-06-09 NOTE — PROGRESS NOTES
"62 year old male transferred to [facility] from ED due to suicidal ideation. Reports feeling that he is \"wasting oxygen and space that could be for someone else\".  Endorses SI, denies HI.  He woke up feeling very overwhelmed due to a variety of life stressors, including caring for his aging mother, conflict with his brothers, recent change to his financial situation, difficulty with making rent on his apartment, and pain due to rib fractures.  He contacted a friend and his girlfriend, who called [facility] for a wellness check.      Pt was intermittently tearful during intake, hyper-verbal and tangential.  He states that he has been \"bottling it all up for the last 26 years\" and he has been struggling for the past 4-5 months.  Attending substance use support groups, participating in group-based physical activities, and therapy have been helpful to him in the past, and he is seeking help with getting connected with resources while he is here.  Denies substance use other than occasional social alcohol consumption and taking medications as prescribed.  Nursing and risk assessments completed. Admission information reviewed with patient. Patient given a tour of [facility] and instructions on using the facility. Questions regarding [facility] addressed. Pt safety search completed.   "

## 2023-06-09 NOTE — ED TRIAGE NOTES
BIBA from home home. Pt with suicidal ideation concerning text messages he sent to his friends and family. Suicidal plans unclear. PD called at his home, pt placed on SADIE. Cooperative en route to ED. VSS.
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
## 2023-06-09 NOTE — ED PROVIDER NOTES
EmPATH Unit - Psychiatry  Combined Observation Note and Discharge Summary  [facility] Emergency Department  Observation Initiation Date: Jun 9, 2023    Alvaro Ascencio MRN: [id number]   Age: 62 year old YOB: 1960     History     Chief Complaint   Patient presents with     Suicidal     Suicidal thoughts      HPI  Alvaro Ascencio is a 62 year old male with a past history notable for anxiety and a major depressive disorder which appears to have been in near remission for some time.  He presented to the emergency department for evaluation of heightened anxiety, depressed mood, and suicidal thoughts.  He was determined to be medically stable and transferred to the EmPATH unit for psychiatric assessment.  On examination, the patient recalls maintaining symptom stability for some time on a combination of Viibryd and clonazepam.  He was seeing a therapist regularly until that person retired, transitioning to an new therapist, who later retired as well.  After a recent fall and several visits to the emergency department, he learned that he had fractured multiple ribs and was experiencing a fair amount of pain as a result.  He explains that he was successfully utilizing yoga to maintain his mental health however after injuring his ribs, he was no longer able to do so.  This combined with psychosocial stressors involving his brother in the living arrangement with his mother, led to a moment of heightened anxiety and acute crises, leading to his arrival in the emergency department.  During his stay, he has had time to tell his story, gain perspective, and gain rest.  Today, his mood is improved and he is no longer experiencing suicidal thoughts.  He is content with his current medications.  He is happy to know that he is gaining a referral to an intensive outpatient program as well as a new therapist.  There was no indication of psychosis or homicidal thoughts.  He interprets readiness to discharge  home.      Past Medical History  Past Medical History:   Diagnosis Date     Basal cell cancer     nose     Vitamin D deficiency 7/25/2012     Past Surgical History:   Procedure Laterality Date     ANKLE SURGERY      right X4     COLONOSCOPY  11/29/2013    Procedure: COLONOSCOPY;  colonoscopy;  Surgeon: Holden Smith MD;  Location:  GI     KNEE SURGERY      bilateral arthroscopy     ORTHOPEDIC SURGERY      foot surgery for incection     Calcium Carbonate-Vitamin D (CALCIUM 500 + D PO)  Cholecalciferol (VITAMIN D) 2000 UNITS tablet  clonazePAM (KLONOPIN) 0.5 MG tablet  folic acid (FOLVITE) 1 MG tablet  HYDROcodone-acetaminophen (NORCO) 5-325 MG tablet  nortriptyline (PAMELOR) 10 MG/5ML solution  oxyCODONE (ROXICODONE) 5 MG tablet  Thiamine HCl (VITAMIN B-1 PO)  TRAZodone (DESYREL) 150 MG tablet  vilazodone (VIIBRYD) 40 MG TABS tablet      Allergies   Allergen Reactions     Gluten Meal      Other reaction(s): Stomach Upset     Tilactase      Other reaction(s): Stomach Upset     Family History  No family history on file.  Social History   Social History     Tobacco Use     Smoking status: Former     Smokeless tobacco: Never   Substance Use Topics     Alcohol use: Yes     Comment: moderately     Drug use: No          Review of Systems  A medically appropriate review of systems was performed with pertinent positives and negatives noted in the HPI, and all other systems negative.    Physical Examination   BP: (!) 163/88  Pulse: 89  Temp: 98.5  F (36.9  C)  Resp: 18  Height: 182.9 cm (6')  Weight: 88.3 kg (194 lb 11.2 oz)  SpO2: 98 %    Physical Exam  General: Appears stated age.   Neuro: Alert and fully oriented. Extremities appear to demonstrate normal strength on visual inspection.   Integumentary/Skin: no rash visualized, normal color    Psychiatric Examination   Appearance: awake, alert  Attitude:  cooperative  Eye Contact:  good  Mood:  better  Affect:  appropriate and in normal range  Speech:  clear,  coherent  Psychomotor Behavior:  no evidence of tardive dyskinesia, dystonia, or tics  Thought Process:  logical and linear  Associations:  no loose associations  Thought Content:  no evidence of suicidal ideation or homicidal ideation and no evidence of psychotic thought  Insight:  fair  Judgement:  fair  Oriented to:  time, person, and place  Attention Span and Concentration:  fair  Recent and Remote Memory:  fair  Language: able to name/identify objects without impairment  Fund of Knowledge: intact with awareness of current and past events    ED Course     ED Course as of 06/09/23 1450   Fri Jun 09, 2023   0726 I obtained history and examined the patient as noted above.        Labs Ordered and Resulted from Time of ED Arrival to Time of ED Departure   COVID-19 VIRUS (CORONAVIRUS) BY PCR - Normal       Result Value    SARS CoV2 PCR Negative         Assessments & Plan (with Medical Decision Making)   Patient presenting with an acute crisis stemming from psychosocial stressors, chronic pain, and loss of therapeutic outlets.  Now that he is nearing 11 hours in the emergency department, the intensity of those symptoms has subsided and he is no longer reporting suicidal thoughts.  His treatment plan focused on optimizing his outpatient supports.  Nursing notes reviewed noting no acute issues.     I have reviewed the assessment completed by the [facility].     During the observation period, the patient did not require medications for agitation, and did not require restraints/seclusion for patient and/or provider safety.    The patient was found to have a psychiatric condition that would benefit from an observation stay in the emergency department for further psychiatric stabilization and/or coordination of a safe disposition. The observation plan includes serial assessments of psychiatric condition, potential administration of medications if indicated, further disposition pending the patient's psychiatric course during the  monitoring period.     Preliminary diagnosis:    ICD-10-CM    1. Suicidal ideation  R45.851       2. Rib pain  R07.81       3. Anxiety  F41.9       4. Major depressive disorder, recurrent episode, in partial remission (H)  F33.41            Treatment Plan:  -Continue Viibryd 40 mg daily to maintain remission of his depressive disorder and better manage anxiety.  -Continue Klonopin for management of his anxiety disorder  -Referral for intensive outpatient programming  -Referral for individual psychotherapy  -Resume outpatient medication management appointments  -Discharge home today.    After the period in observation care, the patient's circumstances and mental state were safe for outpatient management. After counseling on the diagnosis, work-up, and treatment plan, the patient was discharged. Close follow-up with a psychiatrist and/or therapist was recommended and community psychiatric resources were provided. Patient is to return to the ED if any urgent or potentially life-threatening concerns.      At the time of discharge, the patient's acute suicide risk was determined to be low due to the following factors: Reduction in the intensity of mood/anxiety symptoms that preceded the admission, denial of suicidal thoughts, denies feeling helpless or helpless, not currently under the influence of alcohol or illicit substances, denies experiencing command hallucinations, no immediate access to firearms. The patient's acute risk could be higher if noncompliant with their treatment plan, medications, follow-up appointments or using illicit substances or alcohol. Protective factors include: social supports, stable housing    --  Ed Sepulveda MD   [facility] EMERGENCY DEPT  EmPATH Unit        Ed Sepulveda MD  06/09/23 5267

## 2023-06-09 NOTE — DISCHARGE INSTRUCTIONS
Aftercare Plan  If I am feeling unsafe or I am in a crisis, I will:   Contact my established care providers   Call the National Suicide Prevention Lifeline: 988  Go to the nearest emergency room   Call 911     Safety Planning     Warning signs: Disrupted sleep, appetite or mood, increased anger, agitation or irritability, feeling depressed or hopeless, isolating, increased crying, decreased productivity, seeing or hearing things that others may not, thoughts of not wanting to live anymore or of killing myself, thoughts of hurting others.     Things I can do alone to cope or help me feel better: Movies/tv, music, reading, games, drawing/coloring/painting or other art, essential oils, exercise, cleaning/organizing, puzzles, crossword puzzles, word search, Sudoku.     Things that I can do with others to cope or help me feel better: Sometimes just talking or spending time with someone else, sharing a meal or having coffee, watching a movie or playing a game, going for a walk or exercising.      I can also use community resources including mental health hotlines, Psychiatric hospital crisis teams, or apps.     Things I can use or do for distraction: Movies/tv, music, reading, games, drawing/coloring/painting or other art, essential oils, exercise, cleaning/organizing, puzzles, crossword puzzles, word search, Sudoku, expressive writing.     I can also download a meditation or relaxation vikki, like Calm, Headspace, Mindfulness, or Insight Timer (all offer a free version).     Changes I can make to support my mental health: Attend scheduled mental health therapy and psychiatric appointments. Take my medications as prescribed. Maintain a daily schedule/routine. Abstain from all mood-altering substances, including drugs, alcohol, or medications not currently prescribed to me. Implement a self-care routine.        People in my life that I can ask for help: Friends or family, trusted teachers/staff/colleagues, trusted members of my community  "or place of Synagogue, mental health crisis lines, or 911.     Your county has a mental health crisis team you can call 24/7: [facility] Mobile Crisis  [phone]     Other things that are important when I'm in crisis: To remember that the feelings I am having right now are temporary, and it won't feel like this forever, and that it is okay and important to ask for help.     Nutrition:      Eat at least 5 servings of fruit and vegetables each day.     Drink plenty of water throughout the day.     Eat whole-grain bread, whole-wheat pasta, and brown rice instead of white grains, pasta, and rice.     Get adequate Calcium and Vitamin D.      Lifestyle:     Exercise at least 150 minutes a week (30 minutes a day, 5 days of the week). This will help you control your weight and prevent disease.     Limit alcohol to one drink per day.     No smoking.      Wear sunscreen to prevent skin cancer.     See your dentist every six months for an exam and cleaning.     Additional resources and information:     Psychiatry   Dr. Jung Boyd   [facility]   follow up within 10 days      Therapy  Monday, 6/12/2023  Time: 2:00 pm - 2:50 pm  Provider: Nicole Villanueva  MS  [facility]  Location: [facility], [address] [place] [address], [address], [place] [address]  Phone: [phone]      Crisis Lines  Crisis Text Line  Text 554081  You will be connected with a trained live crisis counselor to provide support.    Por espanol, texto  KYLEIGH a 014734 o texto a 442-AYUDAME en WhatsApp    [employer] (LGBTQ Youth Crisis Line)  1.607.411.1991  text START to 414-202      Community Resources  Fast Tracker  Linking people to mental health and substance use disorder resources  fasttrackermn.org     [place] Mental Health Warm Line  Peer to peer support  Monday thru Saturday, 12 pm to 10 pm  327.572.8571 or 1.744.497.8235  Text \"Support\" to 62782    National [place] on Mental Illness (LOS)  " 172.396.8952 or 1.888.LOS.HELPS      Mental Health Apps  My3  https://Quadriserv.org/    VirtualHopeBox  https://[employer]/apps/virtual-hope-box/    TIPP?stands for?Temperature,?Intense exercise,?Paced breathing, and?Paired muscle relaxation.     TEMPERATURE     When we re upset, our bodies often feel hot. To counter this, splash your face with cold water, hold an ice cube, or let the car s AC blow on your face. Changing your body temperature will help you cool down--both physically and emotionally.     INTENSE EXERCISE     Do intense exercise to match your intense emotion. You re not a marathon runner? That s okay, you don t need to be. Sprint down to the end of the street, jump in the pool for a few laps, or do jumping jacks until you ve tired yourself out. Increasing oxygen flow helps decrease stress levels. Plus, it s hard to stay dangerously upset when you re exhausted.     PACED BREATHING     Even something as simple as controlling your breath can have a profound impact on reducing emotional pain. There are many different types of breathing exercises. If you have a favorite, breathe it out. If you don t, try a technique called  box breathing . Each breath interval will be four seconds long. Take in air four seconds, hold it in four seconds, breathe out four, and hold four. And then start again. Continue to focus on this breathing pattern until you feel more calm. Steady breathing reduces your body s fight or flight response.     PAIRED MUSCLE RELAXATION     The science of paired muscle relaxation is fascinating. When you tighten a voluntary muscle, relax it, and allow it to rest, the muscle will become more relaxed than it was before it was tightened. Relaxed muscles require less oxygen,?so your breathing and heart rate will slow down. Try this technique by focusing on a group of muscles, such as the muscles in your arms. Tighten the muscles as much as you can for five seconds. Then let go of the tension.  Let the muscles relax, and you ll begin to relax, as well.       Additional Information  Today you were seen by a licensed mental health professional through Triage and Transition services, [facility] ([facility])  for a crisis assessment in the Emergency Department at [facility].  It is recommended that you follow up with your established providers (psychiatrist, mental health therapist, and/or primary care doctor - as relevant) as soon as possible. Coordinators from [facility] will be calling you in the next 24-48 hours to ensure that you have the resources you need.  You can also contact [facility] coordinators directly at [phone]. You may have been scheduled for or offered an appointment with a mental health provider. [facility] maintains an extensive network of licensed behavioral health providers to connect patients with the services they need.  We do not charge providers a fee to participate in our referral network.  We match patients with providers based on a patient's specific needs, insurance coverage, and location.  Our first effort will be to refer you to a provider within your care system, and will utilize providers outside your care system as needed.

## 2023-06-09 NOTE — PROGRESS NOTES
[facility] Progress Note    Client Name: Alvaro Ascencio  Date: June 9, 2023  Service Type:  Group Therapy  Facilitator: DENNIS Byrd MSW        Response:  Patient did not participate in group.      DENNIS Byrd MSW
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
## 2023-06-09 NOTE — ED NOTES
"Pt states that he has multiple stressors in life including caring for an elderly mother, financial, a recent rib injury, aging, and his father's death. Empath program explained to pt. Pt is eager to get help stating \"Sitting at home crying, isn't going to do anything\". No specific plan. No previous attempts. Pt is tearful, cooperative, and engaging with RN. Pt made statement \"I thought to myself, what is there to wake up for tomorrow? I have lived a good life that has gotten worse since my 50's, I am ready to not be here anymore\".  "

## 2023-06-21 ENCOUNTER — APPOINTMENT (OUTPATIENT)
Dept: GENERAL RADIOLOGY | Facility: CLINIC | Age: 63
End: 2023-06-21
Attending: EMERGENCY MEDICINE
Payer: COMMERCIAL

## 2023-06-21 ENCOUNTER — HOSPITAL ENCOUNTER (EMERGENCY)
Facility: CLINIC | Age: 63
Discharge: HOME | End: 2023-06-21
Attending: EMERGENCY MEDICINE | Admitting: EMERGENCY MEDICINE
Payer: COMMERCIAL

## 2023-06-21 VITALS
HEART RATE: 86 BPM | OXYGEN SATURATION: 98 % | HEIGHT: 72 IN | RESPIRATION RATE: 20 BRPM | DIASTOLIC BLOOD PRESSURE: 89 MMHG | WEIGHT: 190 LBS | BODY MASS INDEX: 25.73 KG/M2 | TEMPERATURE: 98.3 F | SYSTOLIC BLOOD PRESSURE: 151 MMHG

## 2023-06-21 DIAGNOSIS — R07.81 RIB PAIN ON RIGHT SIDE: ICD-10-CM

## 2023-06-21 PROCEDURE — 250N000013 HC RX MED GY IP 250 OP 250 PS 637: Performed by: EMERGENCY MEDICINE

## 2023-06-21 PROCEDURE — 71046 X-RAY EXAM CHEST 2 VIEWS: CPT

## 2023-06-21 PROCEDURE — 93005 ELECTROCARDIOGRAM TRACING: CPT

## 2023-06-21 PROCEDURE — 99284 EMERGENCY DEPT VISIT MOD MDM: CPT | Mod: 25

## 2023-06-21 RX ORDER — OXYCODONE HYDROCHLORIDE 5 MG/1
5 TABLET ORAL ONCE
Status: COMPLETED | OUTPATIENT
Start: 2023-06-21 | End: 2023-06-21

## 2023-06-21 RX ORDER — LIDOCAINE 4 G/G
2 PATCH TOPICAL
Status: DISCONTINUED | OUTPATIENT
Start: 2023-06-21 | End: 2023-06-21 | Stop reason: HOSPADM

## 2023-06-21 RX ORDER — FLUTICASONE PROPIONATE 50 MCG
1 SPRAY, SUSPENSION (ML) NASAL DAILY
Qty: 9.9 ML | Refills: 0 | Status: SHIPPED | OUTPATIENT
Start: 2023-06-21 | End: 2023-06-28

## 2023-06-21 RX ORDER — LIDOCAINE 4 G/G
2 PATCH TOPICAL EVERY 24 HOURS
Qty: 14 PATCH | Refills: 0 | Status: SHIPPED | OUTPATIENT
Start: 2023-06-21 | End: 2023-06-28

## 2023-06-21 RX ORDER — IBUPROFEN 600 MG/1
600 TABLET, FILM COATED ORAL ONCE
Status: COMPLETED | OUTPATIENT
Start: 2023-06-21 | End: 2023-06-21

## 2023-06-21 RX ORDER — BENZONATATE 100 MG/1
100 CAPSULE ORAL 3 TIMES DAILY PRN
Qty: 20 CAPSULE | Refills: 0 | Status: SHIPPED | OUTPATIENT
Start: 2023-06-21 | End: 2023-06-28

## 2023-06-21 RX ADMIN — LIDOCAINE 2 PATCH: 560 PATCH PERCUTANEOUS; TOPICAL; TRANSDERMAL at 07:54

## 2023-06-21 RX ADMIN — OXYCODONE HYDROCHLORIDE 5 MG: 5 TABLET ORAL at 07:54

## 2023-06-21 RX ADMIN — IBUPROFEN 600 MG: 600 TABLET ORAL at 07:54

## 2023-06-21 ASSESSMENT — ACTIVITIES OF DAILY LIVING (ADL)
ADLS_ACUITY_SCORE: 37
ADLS_ACUITY_SCORE: 35

## 2023-06-21 NOTE — ED PROVIDER NOTES
History     Chief Complaint:  Cough and Shortness of Breath     HPI   Alvaro Ascencio is a 62 year old male who suffered multiple right rib fractures on 5/2/23 who presents for evaluation of a cough and shortness of breath. About 48 hours prior to arrival the patient started to develop a frequent productive cough with associated shortness of breath. He has also had some pain in the right side of his chest at the site of his rib fractures that is worse with coughing. He has been taking Tylenol with limited improvement of his pain. His cough has been progressively worsening, and due to concern for this he came into the ED for evaluation. He had some chills yesterday evening but no measured fever. He has been using his incentive spirometer regularly since his rib fractures.       Independent Historian:   None - Patient Only    Review of External Notes:   Reviewed several ED visits related to rib fractures.  Also reviewed urgent care note where patient was prescribed 5% Lidoderm patches.  Prior approval was rejected by patient's insurance and he has not received these.      Medications:    Klonopin  Folic acid  Pamelor  Thiamine  Trazodone  Viibryd     Past Medical History:    basal cell cancer  Vitamin D deficiency  Anxiety  Insomnia   Depression  Irritable bowel syndrome   Hypercholesterolemia   Degenerative disc disease, lumbar     Past Surgical History:    Ankle surgery  Colonoscopy  Knee arthroscopy  Foot surgery  Tonsillectomy      Physical Exam     Patient Vitals for the past 24 hrs:   BP Temp Temp src Pulse Resp SpO2 Height Weight   06/21/23 0700 (!) 148/95 -- -- -- -- -- -- --   06/21/23 0647 (!) 153/90 98.3  F (36.8  C) Oral 93 20 97 % 1.829 m (6') 86.2 kg (190 lb)        Physical Exam  Nursing note and vitals reviewed.    HENT:   Mouth/Throat: Moist mucous membranes.   Eyes: EOMI, nonicteric sclera  Cardiovascular: Normal rate, regular rhythm, no murmurs, rubs, or gallops  Pulmonary/Chest: Effort normal  and breath sounds normal. No respiratory distress. No wheezes. No rales.  Pain to palpation right-sided ribs.  Abdominal: Soft. Nontender, nondistended, no guarding or rigidity.   Musculoskeletal: Normal range of motion.   Neurological: Alert. Moves all extremities spontaneously.   Skin: Skin is warm and dry. No rash noted.   Psychiatric: Normal mood and affect.     Emergency Department Course   ECG  ECG taken at 06:50:29, ECG read at 0656  Normal sinus rhythm, Left axis deviation, Abnormal ECG   Rate 91 bpm. NY interval 190 ms. QRS duration 84 ms. QT/QTc 388/477 ms. P-R-T axes 58 -36 43.      Imaging:  Chest XR,  PA & LAT   Final Result   IMPRESSION: No pneumothorax. The lungs are clear and there are no   pleural effusions. Normal heart size. The patient's known right sixth,   seventh and eighth rib fractures are not visualized on this exam.      SUREKHA BRAXTON MD            SYSTEM ID:  [id number]      Report per radiology     Emergency Department Course & Assessments:     Interventions:  0754 Lidocaine 2 patches Transdermal   0754 Ibuprofen 600 mg PO  0754 Oxycodone 5 mg PO     Assessments:  0738: The patient was seen and evaluated.     0920: I updated and reassessed the patient.     Independent Interpretation (X-rays, CTs, rhythm strip):  Chest x-ray independently reviewed.  No infiltrates noted.  No pneumothorax.    Consultations/Discussion of Management or Tests:  None        Social Determinants of Health affecting care:   None    Disposition:  The patient was discharged to home.     Impression & Plan      Medical Decision Making:  Patient presents with cough, shortness of breath, but mostly right-sided rib pain in the context of rib fractures that were diagnosed about 7 weeks ago.  Chest x-ray negative for pneumonia or other complication.  Vital signs are normal other than mild hypertension.  Patient does not have increased work of breathing.  His biggest concern today is ongoing rib pain.  He was treated  with medications as above with limited improvement.  Discussed with patient that rib fractures do take a long time to heal.  I will prescribe Tessalon to assist with his cough and 4% Lidoderm patches to further assist with his pain.  He was encouraged to continue ibuprofen and Tylenol as well.  Also prescribe some Flonase as patient is having some postnasal drip symptoms and cough may be due to this as opposed to from his rib fractures.  Patient did request a refill of oxycodone which he has been prescribed several times since he fractured his ribs.  Discussed with patient that 7 weeks out from his injury, that narcotic pain medication is no longer indicated, though he could certainly discuss with his primary care physician whether they would be willing to refill it. He is in stable condition at the time of discharge, indications for return to the ED were discussed as well as follow up. All questions were answered.        Diagnosis:    ICD-10-CM    1. Rib pain on right side  R07.81           Discharge Medications:  benzonatate 100 MG capsule  Commonly known as: TESSALON  100 mg, Oral, 3 TIMES DAILY PRN     fluticasone 50 MCG/ACT nasal spray  Commonly known as: FLONASE  1 spray, Both Nostrils, DAILY     Lidocaine 4 % Patch  Commonly known as: LIDOCARE  2 patches, Transdermal, EVERY 24 HOURS, To prevent lidocaine toxicity, patient should be patch free for 12 hrs daily. Do not apply heat over patches.           Scribe Disclosure:  I, Al Klein, am serving as a scribe at 7:23 AM on 6/21/2023 to document services personally performed by Kwame Yadav MD based on my observations and the provider's statements to me.        Kwame Yadav MD  06/23/23 7598

## 2023-06-21 NOTE — ED TRIAGE NOTES
Patient here with shortness of breath and a cough ongoing for 2 days . He stated he was Dx 4 weeks  Ago with rib fx     Triage Assessment     Row Name 06/21/23 0648       Triage Assessment (Adult)    Airway WDL WDL       Respiratory WDL    Respiratory WDL cough       Skin Circulation/Temperature WDL    Skin Circulation/Temperature WDL WDL       Cardiac WDL    Cardiac WDL WDL       Peripheral/Neurovascular WDL    Peripheral Neurovascular WDL WDL       Cognitive/Neuro/Behavioral WDL    Cognitive/Neuro/Behavioral WDL WDL

## 2023-06-21 NOTE — DISCHARGE INSTRUCTIONS
No signs of pneumonia today.     Continue mobic, tylenol for relief of rib pain. I added lidoderm patches to your medication regimen for improved pain relief. These should only be on for 12 hours at a time. Don't apply heat over the top of the patch.     Follow-up with your doctor either Friday or early next week for follow-up/recheck of your cough.     Return to emergency department for fever > 100.4, worsening pain, difficulty breathing, or for any other concerns.

## 2023-06-22 LAB
ATRIAL RATE - MUSE: 91 BPM
DIASTOLIC BLOOD PRESSURE - MUSE: NORMAL MMHG
INTERPRETATION ECG - MUSE: NORMAL
P AXIS - MUSE: 58 DEGREES
PR INTERVAL - MUSE: 190 MS
QRS DURATION - MUSE: 84 MS
QT - MUSE: 388 MS
QTC - MUSE: 477 MS
R AXIS - MUSE: -36 DEGREES
SYSTOLIC BLOOD PRESSURE - MUSE: NORMAL MMHG
T AXIS - MUSE: 43 DEGREES
VENTRICULAR RATE- MUSE: 91 BPM

## 2024-06-02 ENCOUNTER — HEALTH MAINTENANCE LETTER (OUTPATIENT)
Age: 64
End: 2024-06-02

## 2025-06-15 ENCOUNTER — HEALTH MAINTENANCE LETTER (OUTPATIENT)
Age: 65
End: 2025-06-15